# Patient Record
Sex: MALE | Race: BLACK OR AFRICAN AMERICAN | NOT HISPANIC OR LATINO | Employment: FULL TIME | URBAN - NONMETROPOLITAN AREA
[De-identification: names, ages, dates, MRNs, and addresses within clinical notes are randomized per-mention and may not be internally consistent; named-entity substitution may affect disease eponyms.]

---

## 2021-01-11 ENCOUNTER — HOSPITAL ENCOUNTER (EMERGENCY)
Facility: HOSPITAL | Age: 26
Discharge: HOME | End: 2021-01-11
Attending: EMERGENCY MEDICINE
Payer: MEDICAID

## 2021-01-11 VITALS
TEMPERATURE: 99 F | HEART RATE: 90 BPM | SYSTOLIC BLOOD PRESSURE: 110 MMHG | HEIGHT: 67 IN | BODY MASS INDEX: 29.66 KG/M2 | WEIGHT: 189 LBS | DIASTOLIC BLOOD PRESSURE: 64 MMHG | RESPIRATION RATE: 18 BRPM | OXYGEN SATURATION: 99 %

## 2021-01-11 DIAGNOSIS — J01.80 OTHER ACUTE SINUSITIS, RECURRENCE NOT SPECIFIED: Primary | ICD-10-CM

## 2021-01-11 PROCEDURE — 99284 EMERGENCY DEPT VISIT MOD MDM: CPT

## 2021-01-11 RX ORDER — AMOXICILLIN 875 MG/1
875 TABLET, FILM COATED ORAL 2 TIMES DAILY
Qty: 20 TABLET | Refills: 0 | Status: SHIPPED | OUTPATIENT
Start: 2021-01-11 | End: 2021-01-21

## 2021-01-11 RX ORDER — AMOXICILLIN 875 MG/1
875 TABLET, FILM COATED ORAL 2 TIMES DAILY
Qty: 20 TABLET | Refills: 0 | Status: SHIPPED | OUTPATIENT
Start: 2021-01-11 | End: 2021-01-11 | Stop reason: SDUPTHER

## 2021-01-11 RX ORDER — BROMPHENIRAMINE MALEATE, PSEUDOEPHEDRINE HYDROCHLORIDE, AND DEXTROMETHORPHAN HYDROBROMIDE 2; 30; 10 MG/5ML; MG/5ML; MG/5ML
10 SYRUP ORAL
Qty: 118 ML | Refills: 0 | Status: SHIPPED | OUTPATIENT
Start: 2021-01-11 | End: 2021-01-11 | Stop reason: SDUPTHER

## 2021-01-11 RX ORDER — BROMPHENIRAMINE MALEATE, PSEUDOEPHEDRINE HYDROCHLORIDE, AND DEXTROMETHORPHAN HYDROBROMIDE 2; 30; 10 MG/5ML; MG/5ML; MG/5ML
10 SYRUP ORAL
Qty: 118 ML | Refills: 0 | Status: SHIPPED | OUTPATIENT
Start: 2021-01-11 | End: 2021-01-21

## 2021-05-28 ENCOUNTER — HOSPITAL ENCOUNTER (EMERGENCY)
Facility: HOSPITAL | Age: 26
Discharge: HOME | End: 2021-05-28
Attending: EMERGENCY MEDICINE
Payer: MEDICAID

## 2021-05-28 VITALS
SYSTOLIC BLOOD PRESSURE: 109 MMHG | RESPIRATION RATE: 18 BRPM | OXYGEN SATURATION: 99 % | HEART RATE: 60 BPM | DIASTOLIC BLOOD PRESSURE: 67 MMHG | TEMPERATURE: 99 F

## 2021-05-28 DIAGNOSIS — S41.119A LACERATION OF ARM: ICD-10-CM

## 2021-05-28 DIAGNOSIS — S51.812A FOREARM LACERATION, LEFT, INITIAL ENCOUNTER: Primary | ICD-10-CM

## 2021-05-28 LAB
BASOPHILS # BLD AUTO: 0.03 K/UL (ref 0–0.2)
BASOPHILS NFR BLD: 0.2 % (ref 0–1.9)
DIFFERENTIAL METHOD: ABNORMAL
EOSINOPHIL # BLD AUTO: 0.2 K/UL (ref 0–0.5)
EOSINOPHIL NFR BLD: 1.3 % (ref 0–8)
ERYTHROCYTE [DISTWIDTH] IN BLOOD BY AUTOMATED COUNT: 14.4 % (ref 11.5–14.5)
HCT VFR BLD AUTO: 42.8 % (ref 40–54)
HGB BLD-MCNC: 14.1 G/DL (ref 14–18)
IMM GRANULOCYTES # BLD AUTO: 0.05 K/UL (ref 0–0.04)
IMM GRANULOCYTES NFR BLD AUTO: 0.4 % (ref 0–0.5)
LYMPHOCYTES # BLD AUTO: 5.1 K/UL (ref 1–4.8)
LYMPHOCYTES NFR BLD: 41.5 % (ref 18–48)
MCH RBC QN AUTO: 27.9 PG (ref 27–31)
MCHC RBC AUTO-ENTMCNC: 32.9 G/DL (ref 32–36)
MCV RBC AUTO: 85 FL (ref 82–98)
MONOCYTES # BLD AUTO: 0.8 K/UL (ref 0.3–1)
MONOCYTES NFR BLD: 6.5 % (ref 4–15)
NEUTROPHILS # BLD AUTO: 6.1 K/UL (ref 1.8–7.7)
NEUTROPHILS NFR BLD: 50.1 % (ref 38–73)
NRBC BLD-RTO: 0 /100 WBC
PLATELET # BLD AUTO: 263 K/UL (ref 150–450)
PMV BLD AUTO: 10.1 FL (ref 9.2–12.9)
RBC # BLD AUTO: 5.06 M/UL (ref 4.6–6.2)
WBC # BLD AUTO: 12.23 K/UL (ref 3.9–12.7)

## 2021-05-28 PROCEDURE — 85025 COMPLETE CBC W/AUTO DIFF WBC: CPT | Performed by: EMERGENCY MEDICINE

## 2021-05-28 PROCEDURE — 12002 RPR S/N/AX/GEN/TRNK2.6-7.5CM: CPT

## 2021-05-28 PROCEDURE — 96374 THER/PROPH/DIAG INJ IV PUSH: CPT

## 2021-05-28 PROCEDURE — 96361 HYDRATE IV INFUSION ADD-ON: CPT | Mod: 59

## 2021-05-28 PROCEDURE — 90715 TDAP VACCINE 7 YRS/> IM: CPT | Performed by: EMERGENCY MEDICINE

## 2021-05-28 PROCEDURE — 96375 TX/PRO/DX INJ NEW DRUG ADDON: CPT

## 2021-05-28 PROCEDURE — 63600175 PHARM REV CODE 636 W HCPCS: Performed by: EMERGENCY MEDICINE

## 2021-05-28 PROCEDURE — 99284 EMERGENCY DEPT VISIT MOD MDM: CPT | Mod: 25

## 2021-05-28 PROCEDURE — 25000003 PHARM REV CODE 250: Performed by: EMERGENCY MEDICINE

## 2021-05-28 PROCEDURE — 90471 IMMUNIZATION ADMIN: CPT | Performed by: EMERGENCY MEDICINE

## 2021-05-28 PROCEDURE — 36415 COLL VENOUS BLD VENIPUNCTURE: CPT | Performed by: EMERGENCY MEDICINE

## 2021-05-28 PROCEDURE — 96372 THER/PROPH/DIAG INJ SC/IM: CPT | Mod: 59

## 2021-05-28 RX ORDER — LIDOCAINE HYDROCHLORIDE AND EPINEPHRINE 10; 10 MG/ML; UG/ML
10 INJECTION, SOLUTION INFILTRATION; PERINEURAL ONCE
Status: COMPLETED | OUTPATIENT
Start: 2021-05-28 | End: 2021-05-28

## 2021-05-28 RX ORDER — FENTANYL CITRATE 50 UG/ML
50 INJECTION, SOLUTION INTRAMUSCULAR; INTRAVENOUS
Status: COMPLETED | OUTPATIENT
Start: 2021-05-28 | End: 2021-05-28

## 2021-05-28 RX ORDER — KETOROLAC TROMETHAMINE 10 MG/1
10 TABLET, FILM COATED ORAL EVERY 6 HOURS
Qty: 20 TABLET | Refills: 0 | Status: SHIPPED | OUTPATIENT
Start: 2021-05-28 | End: 2021-06-02

## 2021-05-28 RX ORDER — ONDANSETRON 2 MG/ML
4 INJECTION INTRAMUSCULAR; INTRAVENOUS
Status: COMPLETED | OUTPATIENT
Start: 2021-05-28 | End: 2021-05-28

## 2021-05-28 RX ADMIN — SODIUM CHLORIDE 1000 ML: 0.9 INJECTION, SOLUTION INTRAVENOUS at 03:05

## 2021-05-28 RX ADMIN — ONDANSETRON 4 MG: 2 INJECTION INTRAMUSCULAR; INTRAVENOUS at 03:05

## 2021-05-28 RX ADMIN — FENTANYL CITRATE 50 MCG: 50 INJECTION INTRAMUSCULAR; INTRAVENOUS at 03:05

## 2021-05-28 RX ADMIN — CLOSTRIDIUM TETANI TOXOID ANTIGEN (FORMALDEHYDE INACTIVATED), CORYNEBACTERIUM DIPHTHERIAE TOXOID ANTIGEN (FORMALDEHYDE INACTIVATED), BORDETELLA PERTUSSIS TOXOID ANTIGEN (GLUTARALDEHYDE INACTIVATED), BORDETELLA PERTUSSIS FILAMENTOUS HEMAGGLUTININ ANTIGEN (FORMALDEHYDE INACTIVATED), BORDETELLA PERTUSSIS PERTACTIN ANTIGEN, AND BORDETELLA PERTUSSIS FIMBRIAE 2/3 ANTIGEN 0.5 ML: 5; 2; 2.5; 5; 3; 5 INJECTION, SUSPENSION INTRAMUSCULAR at 03:05

## 2021-05-28 RX ADMIN — LIDOCAINE HYDROCHLORIDE,EPINEPHRINE BITARTRATE 10 ML: 10; .01 INJECTION, SOLUTION INFILTRATION; PERINEURAL at 04:05

## 2021-06-05 ENCOUNTER — HOSPITAL ENCOUNTER (EMERGENCY)
Facility: HOSPITAL | Age: 26
Discharge: HOME | End: 2021-06-05
Attending: EMERGENCY MEDICINE
Payer: MEDICAID

## 2021-06-05 VITALS
BODY MASS INDEX: 26.52 KG/M2 | OXYGEN SATURATION: 100 % | DIASTOLIC BLOOD PRESSURE: 86 MMHG | TEMPERATURE: 98 F | SYSTOLIC BLOOD PRESSURE: 115 MMHG | WEIGHT: 175 LBS | HEIGHT: 68 IN | RESPIRATION RATE: 16 BRPM | HEART RATE: 70 BPM

## 2021-06-05 DIAGNOSIS — Z20.2 STD EXPOSURE: Primary | ICD-10-CM

## 2021-06-05 PROCEDURE — 99281 EMR DPT VST MAYX REQ PHY/QHP: CPT

## 2021-06-05 RX ORDER — METRONIDAZOLE 500 MG/1
2000 TABLET ORAL
Status: DISCONTINUED | OUTPATIENT
Start: 2021-06-05 | End: 2021-06-05 | Stop reason: HOSPADM

## 2021-06-07 ENCOUNTER — HOSPITAL ENCOUNTER (EMERGENCY)
Facility: HOSPITAL | Age: 26
Discharge: HOME | End: 2021-06-07
Attending: EMERGENCY MEDICINE
Payer: MEDICAID

## 2021-06-07 VITALS
WEIGHT: 187 LBS | HEIGHT: 68 IN | DIASTOLIC BLOOD PRESSURE: 83 MMHG | RESPIRATION RATE: 18 BRPM | SYSTOLIC BLOOD PRESSURE: 130 MMHG | BODY MASS INDEX: 28.34 KG/M2 | HEART RATE: 106 BPM | OXYGEN SATURATION: 99 % | TEMPERATURE: 98 F

## 2021-06-07 DIAGNOSIS — Z48.02 VISIT FOR SUTURE REMOVAL: Primary | ICD-10-CM

## 2021-06-07 PROCEDURE — 99281 EMR DPT VST MAYX REQ PHY/QHP: CPT

## 2021-09-09 ENCOUNTER — HOSPITAL ENCOUNTER (EMERGENCY)
Facility: HOSPITAL | Age: 26
Discharge: HOME | End: 2021-09-09
Attending: EMERGENCY MEDICINE
Payer: MEDICAID

## 2021-09-09 VITALS
HEIGHT: 68 IN | HEART RATE: 75 BPM | RESPIRATION RATE: 18 BRPM | OXYGEN SATURATION: 98 % | SYSTOLIC BLOOD PRESSURE: 116 MMHG | TEMPERATURE: 98 F | BODY MASS INDEX: 27.89 KG/M2 | DIASTOLIC BLOOD PRESSURE: 81 MMHG | WEIGHT: 184 LBS

## 2021-09-09 DIAGNOSIS — R11.10 VOMITING AND DIARRHEA: Primary | ICD-10-CM

## 2021-09-09 DIAGNOSIS — R19.7 VOMITING AND DIARRHEA: Primary | ICD-10-CM

## 2021-09-09 PROCEDURE — 25000003 PHARM REV CODE 250: Performed by: EMERGENCY MEDICINE

## 2021-09-09 PROCEDURE — 99283 EMERGENCY DEPT VISIT LOW MDM: CPT

## 2021-09-09 RX ORDER — ONDANSETRON 4 MG/1
4 TABLET, FILM COATED ORAL EVERY 6 HOURS PRN
Qty: 12 TABLET | Refills: 0 | Status: SHIPPED | OUTPATIENT
Start: 2021-09-09 | End: 2022-03-10 | Stop reason: CLARIF

## 2021-09-09 RX ORDER — ONDANSETRON 4 MG/1
4 TABLET, ORALLY DISINTEGRATING ORAL
Status: COMPLETED | OUTPATIENT
Start: 2021-09-09 | End: 2021-09-09

## 2021-09-09 RX ADMIN — ONDANSETRON 4 MG: 4 TABLET, ORALLY DISINTEGRATING ORAL at 07:09

## 2021-09-23 ENCOUNTER — HOSPITAL ENCOUNTER (EMERGENCY)
Facility: HOSPITAL | Age: 26
Discharge: HOME | End: 2021-09-23
Attending: STUDENT IN AN ORGANIZED HEALTH CARE EDUCATION/TRAINING PROGRAM
Payer: MEDICAID

## 2021-09-23 VITALS
TEMPERATURE: 98 F | HEART RATE: 66 BPM | OXYGEN SATURATION: 99 % | DIASTOLIC BLOOD PRESSURE: 76 MMHG | WEIGHT: 184 LBS | RESPIRATION RATE: 18 BRPM | HEIGHT: 68 IN | BODY MASS INDEX: 27.89 KG/M2 | SYSTOLIC BLOOD PRESSURE: 120 MMHG

## 2021-09-23 DIAGNOSIS — R21 RASH AND NONSPECIFIC SKIN ERUPTION: Primary | ICD-10-CM

## 2021-09-23 PROCEDURE — 99283 EMERGENCY DEPT VISIT LOW MDM: CPT

## 2021-09-23 RX ORDER — PREDNISONE 20 MG/1
20 TABLET ORAL DAILY
Qty: 5 TABLET | Refills: 0 | Status: SHIPPED | OUTPATIENT
Start: 2021-09-23 | End: 2021-09-28

## 2021-12-27 ENCOUNTER — HOSPITAL ENCOUNTER (EMERGENCY)
Facility: HOSPITAL | Age: 26
Discharge: HOME | End: 2021-12-27
Attending: STUDENT IN AN ORGANIZED HEALTH CARE EDUCATION/TRAINING PROGRAM
Payer: MEDICAID

## 2021-12-27 VITALS
HEIGHT: 68 IN | BODY MASS INDEX: 28.64 KG/M2 | WEIGHT: 189 LBS | TEMPERATURE: 99 F | RESPIRATION RATE: 18 BRPM | SYSTOLIC BLOOD PRESSURE: 111 MMHG | HEART RATE: 104 BPM | DIASTOLIC BLOOD PRESSURE: 72 MMHG | OXYGEN SATURATION: 98 %

## 2021-12-27 DIAGNOSIS — U07.1 COVID-19: Primary | ICD-10-CM

## 2021-12-27 LAB
CTP QC/QA: YES
SARS-COV-2 RDRP RESP QL NAA+PROBE: POSITIVE

## 2021-12-27 PROCEDURE — U0002 COVID-19 LAB TEST NON-CDC: HCPCS | Performed by: NURSE PRACTITIONER

## 2021-12-27 PROCEDURE — 99283 EMERGENCY DEPT VISIT LOW MDM: CPT | Mod: 25

## 2021-12-27 RX ORDER — DICYCLOMINE HYDROCHLORIDE 20 MG/1
20 TABLET ORAL 2 TIMES DAILY
Qty: 6 TABLET | Refills: 0 | Status: SHIPPED | OUTPATIENT
Start: 2021-12-27 | End: 2021-12-30

## 2021-12-27 NOTE — Clinical Note
"Nicholas"Aidee Mckeon was seen and treated in our emergency department on 12/27/2021.     COVID-19 is present in our communities across the state. There is limited testing for COVID at this time, so not all patients can be tested. In this situation, your employee meets the following criteria:    Nicholas Mckeon has met the criteria for COVID-19 testing and has a POSITIVE result. He can return to work once they are asymptomatic for 72 hours without the use of fever reducing medications AND at least ten days from the first positive result.     If you have any questions or concerns, or if I can be of further assistance, please do not hesitate to contact me.    Sincerely,             Fannie Rouse NP"

## 2021-12-28 NOTE — ED PROVIDER NOTES
Encounter Date: 12/27/2021       History     Chief Complaint   Patient presents with    COVID-19 Concerns     Cough, diarrhea, body aches. Pt reports covid exposure.      This is a 25 y/o bm with noncontributory pmhx who presents to the ED with concerns regarding Covid-19 after known exposure. Pt reports waking up today with body aches, nonproductive cough and intermittent diarrhea. Pt denies known fever, chills, chest pain, sob, vomiting, or abdominal pain.         Review of patient's allergies indicates:  No Known Allergies  No past medical history on file.  No past surgical history on file.  No family history on file.  Social History     Tobacco Use    Smoking status: Current Every Day Smoker     Packs/day: 0.50    Smokeless tobacco: Never Used   Substance Use Topics    Alcohol use: Yes     Alcohol/week: 0.0 standard drinks     Comment: very rarely, 1 drink    Drug use: Yes     Types: Marijuana     Review of Systems   Constitutional: Negative.    HENT: Negative.    Respiratory: Positive for cough. Negative for chest tightness and shortness of breath.    Cardiovascular: Negative.    Gastrointestinal: Positive for diarrhea. Negative for abdominal pain, nausea and vomiting.   Musculoskeletal: Positive for myalgias.   Neurological: Negative.        Physical Exam     Initial Vitals [12/27/21 1824]   BP Pulse Resp Temp SpO2   111/72 104 18 98.8 °F (37.1 °C) 98 %      MAP       --         Physical Exam    Nursing note and vitals reviewed.  Constitutional: He appears well-developed and well-nourished. He is active. No distress.   HENT:   Head: Normocephalic and atraumatic.   Mouth/Throat: Oropharynx is clear and moist. No oropharyngeal exudate.   Eyes: EOM are normal. Pupils are equal, round, and reactive to light.   Neck: Neck supple.   Normal range of motion.  Cardiovascular: Normal rate, regular rhythm and normal heart sounds.   Pulmonary/Chest: Breath sounds normal. No respiratory distress.   Abdominal: Abdomen  is soft. Bowel sounds are normal. He exhibits no distension. There is no abdominal tenderness.   Musculoskeletal:         General: Normal range of motion.      Cervical back: Normal range of motion and neck supple.     Neurological: He is alert and oriented to person, place, and time. GCS score is 15. GCS eye subscore is 4. GCS verbal subscore is 5. GCS motor subscore is 6.   Skin: Skin is warm and dry. Capillary refill takes less than 2 seconds.   Psychiatric: He has a normal mood and affect. His behavior is normal. Thought content normal.         ED Course   Procedures  Labs Reviewed   SARS-COV-2 RDRP GENE - Abnormal; Notable for the following components:       Result Value    POC Rapid COVID Positive (*)     All other components within normal limits    Narrative:     This test utilizes isothermal nucleic acid amplification   technology to detect the SARS-CoV-2 RdRp nucleic acid segment.   The analytical sensitivity (limit of detection) is 125 genome   equivalents/mL.   A POSITIVE result implies infection with the SARS-CoV-2 virus;   the patient is presumed to be contagious.     A NEGATIVE result means that SARS-CoV-2 nucleic acids are not   present above the limit of detection. A NEGATIVE result should be   treated as presumptive. It does not rule out the possibility of   COVID-19 and should not be the sole basis for treatment decisions.   If COVID-19 is strongly suspected based on clinical and exposure   history, re-testing using an alternate molecular assay should be   considered.   This test is only for use under the Food and Drug   Administration s Emergency Use Authorization (EUA).   Commercial kits are provided by [employer].   Performance characteristics of the EUA have been independently   verified by [facility] Department of   Pathology and Laboratory Medicine.   _________________________________________________________________   The authorized Fact Sheet for Healthcare Providers and  the authorized Fact   Sheet for Patients of the ID NOW COVID-19 are available on the FDA   website:     https://www.fda.gov/media/051110/download  https://www.fda.gov/media/626144/download                Imaging Results    None          Medications - No data to display              ED Course as of 12/27/21 1837   Mon Dec 27, 2021   1836 Rapid covid-19 test pos [CB]      ED Course User Index  [CB] Fannie Rouse NP             Clinical Impression:   Final diagnoses:  [U07.1] COVID-19 (Primary)          ED Disposition Condition    Discharge Stable        ED Prescriptions     Medication Sig Dispense Start Date End Date Auth. Provider    dicyclomine (BENTYL) 20 mg tablet Take 1 tablet (20 mg total) by mouth 2 (two) times daily. for 3 days 6 tablet 12/27/2021 12/30/2021 Fannie Rouse NP        Follow-up Information     Follow up With Specialties Details Why Contact Info    PCP Follow UP  Call in 2 days for follow-up, for re-evaluation of today's complaint            Fannie Rouse NP  12/27/21 1837

## 2021-12-28 NOTE — DISCHARGE INSTRUCTIONS
Take medications as directed, and you may take over-the-counter medications as directed for your symptoms.  Alternate Tylenol and Motrin every 3 hours as directed for pain/fever.  Return to the emergency room for worsening condition.

## 2022-01-08 NOTE — ED PROVIDER NOTES
Encounter Date: 12/27/2021       History     Chief Complaint   Patient presents with    COVID-19 Concerns     Cough, diarrhea, body aches. Pt reports covid exposure.      27 y/o female covid exposure presents with flu like symptom        Review of patient's allergies indicates:  No Known Allergies  No past medical history on file.  No past surgical history on file.  No family history on file.  Social History     Tobacco Use    Smoking status: Current Every Day Smoker     Packs/day: 0.50    Smokeless tobacco: Never Used   Substance Use Topics    Alcohol use: Yes     Alcohol/week: 0.0 standard drinks     Comment: very rarely, 1 drink    Drug use: Yes     Types: Marijuana     Review of Systems   Constitutional: Negative.    HENT: Negative.    Respiratory: Positive for cough.    Cardiovascular: Negative.    Gastrointestinal: Positive for diarrhea.   Genitourinary: Negative.    Musculoskeletal: Positive for myalgias.   Skin: Negative.    Neurological: Negative.    Psychiatric/Behavioral: Negative.    All other systems reviewed and are negative.      Physical Exam     Initial Vitals [12/27/21 1824]   BP Pulse Resp Temp SpO2   111/72 104 18 98.8 °F (37.1 °C) 98 %      MAP       --         Physical Exam    Constitutional: Vital signs are normal. He appears well-developed and well-nourished.   HENT:   Head: Normocephalic and atraumatic.   Eyes: Conjunctivae and lids are normal.   Neck: Trachea normal. Neck supple.   Cardiovascular: Normal rate, regular rhythm and normal pulses.   Pulmonary/Chest: Breath sounds normal. He has no wheezes. He has no rhonchi.   Abdominal: Abdomen is soft. Bowel sounds are normal.   Musculoskeletal:         General: Normal range of motion.      Cervical back: Neck supple.     Neurological: He is alert and oriented to person, place, and time.   Skin: Skin is warm. Capillary refill takes less than 2 seconds.   Psychiatric: He has a normal mood and affect. His speech is normal.         ED  Course   Procedures  Labs Reviewed   SARS-COV-2 RDRP GENE - Abnormal; Notable for the following components:       Result Value    POC Rapid COVID Positive (*)     All other components within normal limits    Narrative:     This test utilizes isothermal nucleic acid amplification   technology to detect the SARS-CoV-2 RdRp nucleic acid segment.   The analytical sensitivity (limit of detection) is 125 genome   equivalents/mL.   A POSITIVE result implies infection with the SARS-CoV-2 virus;   the patient is presumed to be contagious.     A NEGATIVE result means that SARS-CoV-2 nucleic acids are not   present above the limit of detection. A NEGATIVE result should be   treated as presumptive. It does not rule out the possibility of   COVID-19 and should not be the sole basis for treatment decisions.   If COVID-19 is strongly suspected based on clinical and exposure   history, re-testing using an alternate molecular assay should be   considered.   This test is only for use under the Food and Drug   Administration s Emergency Use Authorization (EUA).   Commercial kits are provided by [employer].   Performance characteristics of the EUA have been independently   verified by [facility] Department of   Pathology and Laboratory Medicine.   _________________________________________________________________   The authorized Fact Sheet for Healthcare Providers and the authorized Fact   Sheet for Patients of the ID NOW COVID-19 are available on the FDA   website:     https://www.fda.gov/media/930768/download  https://www.fda.gov/media/297890/download                Imaging Results    None          Medications - No data to display  Medical Decision Making:   Initial Assessment:   27 y/o male covid positive. Afebrile vitals stable. Recommend symptomatic relief. Return precaution. covid [facility]  Clinical Tests:   Lab Tests: Ordered and Reviewed             ED Course as of 01/08/22 0348   Mon Dec 27, 2021   0942 Rapid  covid-19 test pos [CB]      ED Course User Index  [CB] Fannie Rouse NP             Clinical Impression:   Final diagnoses:  [U07.1] COVID-19 (Primary)          ED Disposition Condition    Discharge Stable        ED Prescriptions     Medication Sig Dispense Start Date End Date Auth. Provider    dicyclomine (BENTYL) 20 mg tablet () Take 1 tablet (20 mg total) by mouth 2 (two) times daily. for 3 days 6 tablet 2021 Fannie Rouse NP        Follow-up Information     Follow up With Specialties Details Why Contact Info    PCP Follow UP  Call in 2 days for follow-up, for re-evaluation of today's complaint            Jose De Jesus Peralta MD  [phone]

## 2022-03-10 ENCOUNTER — HOSPITAL ENCOUNTER (EMERGENCY)
Facility: HOSPITAL | Age: 27
Discharge: HOME | End: 2022-03-10
Attending: FAMILY MEDICINE
Payer: MEDICAID

## 2022-03-10 VITALS
HEART RATE: 81 BPM | WEIGHT: 186 LBS | TEMPERATURE: 98 F | BODY MASS INDEX: 28.19 KG/M2 | RESPIRATION RATE: 18 BRPM | OXYGEN SATURATION: 100 % | HEIGHT: 68 IN | DIASTOLIC BLOOD PRESSURE: 96 MMHG | SYSTOLIC BLOOD PRESSURE: 139 MMHG

## 2022-03-10 DIAGNOSIS — K52.9 GASTROENTERITIS: Primary | ICD-10-CM

## 2022-03-10 PROCEDURE — 99283 EMERGENCY DEPT VISIT LOW MDM: CPT

## 2022-03-10 PROCEDURE — 25000003 PHARM REV CODE 250: Performed by: FAMILY MEDICINE

## 2022-03-10 RX ORDER — ONDANSETRON 4 MG/1
4 TABLET, ORALLY DISINTEGRATING ORAL
Status: COMPLETED | OUTPATIENT
Start: 2022-03-10 | End: 2022-03-10

## 2022-03-10 RX ORDER — ONDANSETRON 4 MG/1
4 TABLET, FILM COATED ORAL EVERY 6 HOURS
Qty: 12 TABLET | Refills: 0 | Status: SHIPPED | OUTPATIENT
Start: 2022-03-10

## 2022-03-10 RX ADMIN — ONDANSETRON 4 MG: 4 TABLET, ORALLY DISINTEGRATING ORAL at 07:03

## 2022-03-10 NOTE — ED PROVIDER NOTES
Encounter Date: 3/10/2022       History     Chief Complaint   Patient presents with    Vomiting     N/V SINCE 6:30 THIS AM       Vomiting   This is a new problem. The current episode started today. Episode frequency: One time. The problem has been unchanged. The emesis has an appearance of stomach contents. Associated symptoms include diarrhea. Pertinent negatives include no abdominal pain, no arthralgias, no chills, no cough, no fever, no headaches, no myalgias, no sweats and no URI.     Review of patient's allergies indicates:  No Known Allergies  History reviewed. No pertinent past medical history.  History reviewed. No pertinent surgical history.  History reviewed. No pertinent family history.  Social History     Tobacco Use    Smoking status: Current Every Day Smoker     Packs/day: 0.50    Smokeless tobacco: Never Used   Substance Use Topics    Alcohol use: Yes     Alcohol/week: 0.0 standard drinks     Comment: very rarely, 1 drink    Drug use: Yes     Types: Marijuana     Review of Systems   Constitutional: Negative for chills and fever.   Respiratory: Negative for cough.    Gastrointestinal: Positive for diarrhea and vomiting. Negative for abdominal pain.   Musculoskeletal: Negative for arthralgias and myalgias.   Neurological: Negative for headaches.   All other systems reviewed and are negative.      Physical Exam     Initial Vitals [03/10/22 0716]   BP Pulse Resp Temp SpO2   (!) 139/96 81 18 97.7 °F (36.5 °C) 100 %      MAP       --         Physical Exam    Nursing note and vitals reviewed.  Constitutional: Vital signs are normal. He appears well-developed and well-nourished.   HENT:   Head: Normocephalic and atraumatic.   Eyes: Conjunctivae, EOM and lids are normal. Pupils are equal, round, and reactive to light. Lids are everted and swept, no foreign bodies found.   Neck: Trachea normal and phonation normal. Neck supple.   Normal range of motion.   Full passive range of motion without pain.      Cardiovascular: Normal rate, regular rhythm, normal heart sounds, intact distal pulses and normal pulses.   Pulmonary/Chest: Breath sounds normal. No respiratory distress. He has no wheezes. He has no rhonchi. He has no rales. He exhibits no tenderness.   Abdominal: Abdomen is soft. Bowel sounds are normal. He exhibits no distension and no mass. There is no abdominal tenderness. There is no rebound and no guarding.   Musculoskeletal:         General: Normal range of motion.      Cervical back: Full passive range of motion without pain, normal range of motion and neck supple.     Neurological: He is alert and oriented to person, place, and time. He has normal strength and normal reflexes.   Skin: Capillary refill takes less than 2 seconds.         ED Course   Procedures  Labs Reviewed - No data to display       Imaging Results    None          Medications   ondansetron disintegrating tablet 4 mg (4 mg Oral Given 3/10/22 0728)     Medical Decision Making:   ED Management:  Patient afebrile, tolerate Sprite in ED.  patient's presentation and symptoms are consistent with viral gastroenteritis.  Discussed with patient risks and benefits medication fluid side effects.  Patient will be started on bland diet and Zofran p.r.n. nausea.  Discussed with patient that if symptoms increase or worsen to return to ED or see PCP.  Patient reports he understands plan of care                      Clinical Impression:   Final diagnoses:  [K52.9] Gastroenteritis (Primary)          ED Disposition Condition    Discharge Stable        ED Prescriptions     Medication Sig Dispense Start Date End Date Auth. Provider    ondansetron (ZOFRAN) 4 MG tablet Take 1 tablet (4 mg total) by mouth every 6 (six) hours. 12 tablet 3/10/2022  Wing Woodward Jr., MD        Follow-up Information     Follow up With Specialties Details Why Contact Info    PCP  In 2 days As needed, If symptoms worsen            Wing Woodward Jr., MD  03/10/22 [id number]

## 2022-03-10 NOTE — Clinical Note
"Nicholas"Aidee Mckeon was seen and treated in our emergency department on 3/10/2022.  He may return to work on 03/11/2022.       If you have any questions or concerns, please don't hesitate to call.      Wing Woodward Jr., MD"

## 2022-04-14 ENCOUNTER — HOSPITAL ENCOUNTER (EMERGENCY)
Facility: HOSPITAL | Age: 27
Discharge: HOME | End: 2022-04-14
Attending: EMERGENCY MEDICINE
Payer: MEDICAID

## 2022-04-14 VITALS
HEIGHT: 67 IN | RESPIRATION RATE: 18 BRPM | HEART RATE: 61 BPM | OXYGEN SATURATION: 98 % | TEMPERATURE: 98 F | DIASTOLIC BLOOD PRESSURE: 80 MMHG | SYSTOLIC BLOOD PRESSURE: 131 MMHG | WEIGHT: 181 LBS | BODY MASS INDEX: 28.41 KG/M2

## 2022-04-14 DIAGNOSIS — S39.012A LUMBAR STRAIN, INITIAL ENCOUNTER: Primary | ICD-10-CM

## 2022-04-14 PROCEDURE — 63600175 PHARM REV CODE 636 W HCPCS: Performed by: EMERGENCY MEDICINE

## 2022-04-14 PROCEDURE — 96372 THER/PROPH/DIAG INJ SC/IM: CPT | Performed by: EMERGENCY MEDICINE

## 2022-04-14 PROCEDURE — 25000003 PHARM REV CODE 250: Performed by: EMERGENCY MEDICINE

## 2022-04-14 PROCEDURE — 99284 EMERGENCY DEPT VISIT MOD MDM: CPT | Mod: 25

## 2022-04-14 RX ORDER — KETOROLAC TROMETHAMINE 30 MG/ML
15 INJECTION, SOLUTION INTRAMUSCULAR; INTRAVENOUS
Status: COMPLETED | OUTPATIENT
Start: 2022-04-14 | End: 2022-04-14

## 2022-04-14 RX ORDER — LIDOCAINE 50 MG/G
1 PATCH TOPICAL DAILY PRN
Qty: 10 PATCH | Refills: 0 | Status: SHIPPED | OUTPATIENT
Start: 2022-04-14 | End: 2022-04-24

## 2022-04-14 RX ORDER — NAPROXEN 500 MG/1
500 TABLET ORAL EVERY 12 HOURS PRN
Qty: 20 TABLET | Refills: 0 | Status: SHIPPED | OUTPATIENT
Start: 2022-04-14

## 2022-04-14 RX ORDER — LIDOCAINE 50 MG/G
1 PATCH TOPICAL
Status: DISCONTINUED | OUTPATIENT
Start: 2022-04-14 | End: 2022-04-14 | Stop reason: HOSPADM

## 2022-04-14 RX ADMIN — KETOROLAC TROMETHAMINE 15 MG: 30 INJECTION, SOLUTION INTRAMUSCULAR at 08:04

## 2022-04-14 RX ADMIN — LIDOCAINE 1 PATCH: 50 PATCH TOPICAL at 08:04

## 2022-04-14 NOTE — ED NOTES
NEUROLOGICAL:   Patient is awake , alert  and oriented x 4 . Pupils are PERRL. Gait is steady.   Moves all extremities without difficulty.   Patient reports no neuro complaints..  GCS 15    HEENT:   Head appears normocephalic  and symmetric .   Eyes appear WNL to both eyes. Patient reports no complaints  to both eyes .   Ears appear WNL. Patient reports no complaints  to both ears.   Nares appear patent . Patient reports no nose complaints .  Mouth appears moist, pink and teeth intact. Patient reports no mouth complaints.   Throat appears pink and moist . Patient reports no throat complaints.    CARDIOVASCULAR:   S1 and S2 present, no murmurs, gallops, or rubs, rate regular  and pulses palpable (2+)    On palpation no edema noted , noted to none.   Patient reports no CV complaints.  .   Patient vitals are WNL.    RESPIRATORY:   Airway Clear, Open, and Patent.  Respirations are even and unlabored.   Breath sounds clear  to all lung fields.   Patient reports no respiratory complaints.     GASTROINTESTINAL:   Abdomen is soft  and non-tender x 4 quadrants. Bowel sounds are normoactive to all quadrants .   Patient reports no GI complaints .     GENITOURINARY:   Patient reports no  complaints.     MUSCULOSKELETAL:   full range of motion to all extremities, no swelling noted , no tenderness noted and no weakness noted.   Patient reports pain in right mid back upon movement.    SKIN:   Skin appears warm , dry , good turgor, color normal for race and intact. Patient reports no skin complaints.

## 2022-04-14 NOTE — ED PROVIDER NOTES
EMERGENCY DEPARTMENT HISTORY AND PHYSICAL EXAM          Date: 4/14/2022   Patient Name: Nicholas Mckeon       History of Presenting Illness           Chief Complaint   Patient presents with    Back Pain     [place] weed eater in just his right hand and he now has pain in his mid right back area.         History Provided By: Patient    0817   Nicholas Mckeon is a 27 y.o. male with PMHX of no significant history who presents to the emergency department C/O back pain.    Patient reports that he was working yesterday using a weed whacker.  Says at 1 point he was holding the weed whacker in his right hand only and doing sweeping like motions.  He thinks he might have pulled a muscle in his back.  He reports right side of the low back pain worse with certain body motions.      PCP: Primary Doctor No        Current Facility-Administered Medications   Medication Dose Route Frequency Provider Last Rate Last Admin    ketorolac injection 15 mg  15 mg Intramuscular ED 1 Time Pedro Martinez MD        LIDOcaine 5 % patch 1 patch  1 patch Transdermal ED 1 Time Pedro Martinez MD         Current Outpatient Medications   Medication Sig Dispense Refill    LIDOcaine (LIDODERM) 5 % Place 1 patch onto the skin daily as needed (Pain). Remove & Discard patch within 12 hours or as directed by MD 10 patch 0    naproxen (NAPROSYN) 500 MG tablet Take 1 tablet (500 mg total) by mouth every 12 (twelve) hours as needed (Pain). 20 tablet 0    ondansetron (ZOFRAN) 4 MG tablet Take 1 tablet (4 mg total) by mouth every 6 (six) hours. 12 tablet 0           Past History     Past Medical History:   No past medical history on file.     Past Surgical History:   No past surgical history on file.     Family History:   No family history on file.     Social History:   Social History     Tobacco Use    Smoking status: Current Every Day Smoker     Packs/day: 0.50    Smokeless  "tobacco: Never Used   Substance Use Topics    Alcohol use: Yes     Alcohol/week: 0.0 standard drinks     Comment: very rarely, 1 drink    Drug use: Yes     Types: Marijuana        Allergies:   Review of patient's allergies indicates:  No Known Allergies       Review of Systems   Review of Systems   Constitutional: Negative for appetite change, chills and fever.   HENT: Negative for congestion, rhinorrhea and sore throat.    Eyes: Negative for pain and discharge.   Respiratory: Negative for cough, chest tightness, shortness of breath and wheezing.    Cardiovascular: Negative for chest pain and palpitations.   Gastrointestinal: Negative for abdominal pain, nausea and vomiting.   Genitourinary: Negative for difficulty urinating and dysuria.   Musculoskeletal: Positive for back pain. Negative for myalgias and neck pain.   Skin: Negative for rash and wound.   Neurological: Negative for dizziness, weakness, numbness and headaches.   Psychiatric/Behavioral: Negative for confusion and dysphoric mood.   All other systems reviewed and are negative.               Physical Exam     Vitals:    04/14/22 0811   BP: 131/80   BP Location: Right arm   Patient Position: Sitting   Pulse: 61   Resp: 18   Temp: 97.8 °F (36.6 °C)   TempSrc: Oral   SpO2: 98%   Weight: 82.1 kg (181 lb)   Height: 5' 7" (1.702 m)      Physical Exam  Vitals and nursing note reviewed.   Constitutional:       General: He is not in acute distress.     Appearance: Normal appearance. He is well-developed. He is not ill-appearing.   HENT:      Head: Normocephalic and atraumatic.      Nose: No congestion or rhinorrhea.   Eyes:      Conjunctiva/sclera: Conjunctivae normal.      Pupils: Pupils are equal, round, and reactive to light.   Cardiovascular:      Rate and Rhythm: Regular rhythm.   Pulmonary:      Effort: Pulmonary effort is normal. No respiratory distress.   Musculoskeletal:         General: No deformity or signs of injury. Normal range of motion.      " Cervical back: Normal range of motion and neck supple.      Lumbar back: Tenderness present. No bony tenderness. Normal range of motion. Negative right straight leg raise test and negative left straight leg raise test.        Back:    Skin:     General: Skin is warm and dry.      Coloration: Skin is not pale.      Findings: No lesion.   Neurological:      General: No focal deficit present.      Mental Status: He is alert and oriented to person, place, and time.      Cranial Nerves: No cranial nerve deficit.      Sensory: No sensory deficit.      Motor: No weakness.   Psychiatric:         Mood and Affect: Mood normal.         Behavior: Behavior normal.              Diagnostic Study Results      Labs -   No results found for this or any previous visit (from the past 12 hour(s)).     Radiologic Studies -    No orders to display        Medications given in the ED-   Medications   ketorolac injection 15 mg (has no administration in time range)   LIDOcaine 5 % patch 1 patch (has no administration in time range)           Medical Decision Making    I am the first provider for this patient.     I reviewed the vital signs, available nursing notes, past medical history, past surgical history, family history and social history.     Vital Signs:  Reviewed the patient's vital signs.     Pulse Oximetry Analysis and Interpretation:    98% on Room Air, normal      Records Reviewed: Nursing notes.        Provider Notes (Medical Decision Making): Nicholas Mckeon is a 27 y.o. male here with low back pain.  Benign exam.  No red flag symptoms.  Patient requesting work note which was provided.      Procedures:   Procedures      ED Course:               Diagnosis and Disposition     Critical Care:      DISCHARGE NOTE:       Nicholas Mckeon's  results have been reviewed with him.  He has been counseled regarding his diagnosis, treatment, and plan.  He verbally conveys understanding and agreement of the signs, symptoms, diagnosis,  treatment and prognosis and additionally agrees to follow up as discussed.  He also agrees with the care-plan and conveys that all of his questions have been answered.  I have also provided discharge instructions for him that include: educational information regarding their diagnosis and treatment, and list of reasons why they would want to return to the ED prior to their follow-up appointment, should his condition change. He has been provided with education for proper emergency department utilization.         CLINICAL IMPRESSION:         1. Lumbar strain, initial encounter              PLAN:   1. Discharge Home  2.      Medication List      START taking these medications    LIDOcaine 5 %  Commonly known as: LIDODERM  Place 1 patch onto the skin daily as needed (Pain). Remove & Discard patch within 12 hours or as directed by MD     naproxen 500 MG tablet  Commonly known as: NAPROSYN  Take 1 tablet (500 mg total) by mouth every 12 (twelve) hours as needed (Pain).        ASK your doctor about these medications    ondansetron 4 MG tablet  Commonly known as: ZOFRAN  Take 1 tablet (4 mg total) by mouth every 6 (six) hours.           Where to Get Your Medications      These medications were sent to [id number] - [address] AT [facility] & [address] [address]    Phone: [phone]   · LIDOcaine 5 %  · naproxen 500 MG tablet        [facility]. [place] - Emergency Department  [address] [facility] [address] [phone]  Go to   If symptoms worsen       _______________________________     Please note that this dictation was completed with [employer], the computer voice recognition software.  Quite often unanticipated grammatical, syntax, homophones, and other interpretive errors are inadvertently transcribed by the computer software.  Please disregard these errors.  Please excuse any errors that have escaped final proofreading.              Pedro Martinez MD  04/14/22 0819

## 2022-04-14 NOTE — Clinical Note
"Nicholas Menjivar" Zoeyyamilemargarita was seen and treated in our emergency department on 4/14/2022.  He may return to work on 04/18/2022.       If you have any questions or concerns, please don't hesitate to call.      Nguyen Pack RN    "

## 2022-08-17 ENCOUNTER — HOSPITAL ENCOUNTER (EMERGENCY)
Facility: HOSPITAL | Age: 27
Discharge: HOME | End: 2022-08-17
Attending: EMERGENCY MEDICINE
Payer: MEDICAID

## 2022-08-17 VITALS
BODY MASS INDEX: 28.66 KG/M2 | SYSTOLIC BLOOD PRESSURE: 121 MMHG | WEIGHT: 182.63 LBS | HEART RATE: 67 BPM | HEIGHT: 67 IN | RESPIRATION RATE: 18 BRPM | OXYGEN SATURATION: 100 % | DIASTOLIC BLOOD PRESSURE: 84 MMHG | TEMPERATURE: 98 F

## 2022-08-17 DIAGNOSIS — J02.9 PHARYNGITIS, UNSPECIFIED ETIOLOGY: Primary | ICD-10-CM

## 2022-08-17 PROCEDURE — 63600175 PHARM REV CODE 636 W HCPCS: Performed by: EMERGENCY MEDICINE

## 2022-08-17 PROCEDURE — 96372 THER/PROPH/DIAG INJ SC/IM: CPT | Performed by: EMERGENCY MEDICINE

## 2022-08-17 PROCEDURE — 99284 EMERGENCY DEPT VISIT MOD MDM: CPT

## 2022-08-17 RX ORDER — DEXAMETHASONE SODIUM PHOSPHATE 4 MG/ML
8 INJECTION, SOLUTION INTRA-ARTICULAR; INTRALESIONAL; INTRAMUSCULAR; INTRAVENOUS; SOFT TISSUE
Status: COMPLETED | OUTPATIENT
Start: 2022-08-17 | End: 2022-08-17

## 2022-08-17 RX ADMIN — DEXAMETHASONE SODIUM PHOSPHATE 8 MG: 4 INJECTION, SOLUTION INTRA-ARTICULAR; INTRALESIONAL; INTRAMUSCULAR; INTRAVENOUS; SOFT TISSUE at 08:08

## 2022-08-17 NOTE — ED PROVIDER NOTES
Encounter Date: 8/17/2022       History     Chief Complaint   Patient presents with    Sore Throat     Pt presents to the ER due to sore throat and congestion. Pt states symptoms started yesterday, complains of difficulty swallowing, runny nose/congestion, and a sore throat. Pt denies any other complaints, states no known exposure to sick persons.     27-year-old male complaining of sore throat since yesterday.  No fever.  No trouble swallowing.  No hot potato voice.  Complaining of sinus congestion as well.  No nausea vomiting diarrhea no shortness of breath.  No known exposure to COVID-19        Review of patient's allergies indicates:  No Known Allergies  History reviewed. No pertinent past medical history.  No past surgical history on file.  No family history on file.  Social History     Tobacco Use    Smoking status: Current Every Day Smoker     Packs/day: 0.50     Years: 12.00     Pack years: 6.00     Types: Vaping with nicotine    Smokeless tobacco: Never Used   Substance Use Topics    Alcohol use: Yes     Alcohol/week: 0.0 standard drinks     Comment: very rarely, 1 drink    Drug use: Yes     Frequency: 7.0 times per week     Types: Marijuana     Comment: pt states he smokes marijuana daily     Review of Systems   Constitutional: Negative for fever.   HENT: Positive for congestion and sore throat.    Respiratory: Negative for shortness of breath.    Cardiovascular: Negative for chest pain.   Gastrointestinal: Negative for nausea.   Genitourinary: Negative for dysuria.   Musculoskeletal: Negative for back pain.   Skin: Negative for rash.   Neurological: Negative for weakness.   Hematological: Does not bruise/bleed easily.   All other systems reviewed and are negative.      Physical Exam     Initial Vitals [08/17/22 0810]   BP Pulse Resp Temp SpO2   121/84 67 18 97.6 °F (36.4 °C) 100 %      MAP       --         Physical Exam    Nursing note and vitals reviewed.  Constitutional: He appears well-developed  and well-nourished. He is not diaphoretic. No distress.   HENT:   Head: Normocephalic and atraumatic.   Minimal erythema posterior pharynx, no exudate, airway patent   Eyes: Conjunctivae and EOM are normal. Pupils are equal, round, and reactive to light. Right eye exhibits no discharge. Left eye exhibits no discharge. No scleral icterus.   Neck: Neck supple. No JVD present.   Normal range of motion.  Cardiovascular: Normal rate, regular rhythm, normal heart sounds and intact distal pulses.   No murmur heard.  Pulmonary/Chest: Breath sounds normal. No stridor. No respiratory distress. He has no wheezes. He has no rhonchi. He has no rales. He exhibits no tenderness.   Abdominal: Abdomen is soft. Bowel sounds are normal. He exhibits no distension and no mass. There is no abdominal tenderness. There is no rebound and no guarding.   Musculoskeletal:         General: No tenderness or edema. Normal range of motion.      Cervical back: Normal range of motion and neck supple.     Neurological: He is alert and oriented to person, place, and time. He has normal strength. GCS score is 15. GCS eye subscore is 4. GCS verbal subscore is 5. GCS motor subscore is 6.   Skin: Skin is warm and dry. Capillary refill takes less than 2 seconds.         ED Course   Procedures  Labs Reviewed - No data to display       Imaging Results    None          Medications   dexamethasone injection 8 mg (has no administration in time range)     Medical Decision Making:   Differential Diagnosis:   Pharyngitis                      Clinical Impression:   Final diagnoses:  [J02.9] Pharyngitis, unspecified etiology (Primary)          ED Disposition Condition    Discharge Stable        ED Prescriptions     None        Follow-up Information     Follow up With Specialties Details Why Contact Info    Primary care physician  In 2 days             Iron Velazquez MD  08/17/22 5089

## 2022-12-27 ENCOUNTER — HOSPITAL ENCOUNTER (EMERGENCY)
Facility: HOSPITAL | Age: 27
Discharge: HOME | End: 2022-12-27
Attending: EMERGENCY MEDICINE
Payer: MEDICAID

## 2022-12-27 VITALS
DIASTOLIC BLOOD PRESSURE: 77 MMHG | OXYGEN SATURATION: 97 % | HEIGHT: 67 IN | SYSTOLIC BLOOD PRESSURE: 132 MMHG | TEMPERATURE: 98 F | WEIGHT: 214 LBS | HEART RATE: 64 BPM | BODY MASS INDEX: 33.59 KG/M2 | RESPIRATION RATE: 18 BRPM

## 2022-12-27 DIAGNOSIS — J02.9 ACUTE PHARYNGITIS, UNSPECIFIED ETIOLOGY: Primary | ICD-10-CM

## 2022-12-27 LAB — GROUP A STREP, MOLECULAR: NEGATIVE

## 2022-12-27 PROCEDURE — 96372 THER/PROPH/DIAG INJ SC/IM: CPT | Performed by: NURSE PRACTITIONER

## 2022-12-27 PROCEDURE — 63600175 PHARM REV CODE 636 W HCPCS: Performed by: NURSE PRACTITIONER

## 2022-12-27 PROCEDURE — 87651 STREP A DNA AMP PROBE: CPT | Performed by: NURSE PRACTITIONER

## 2022-12-27 PROCEDURE — 99284 EMERGENCY DEPT VISIT MOD MDM: CPT

## 2022-12-27 RX ORDER — DEXAMETHASONE SODIUM PHOSPHATE 4 MG/ML
8 INJECTION, SOLUTION INTRA-ARTICULAR; INTRALESIONAL; INTRAMUSCULAR; INTRAVENOUS; SOFT TISSUE
Status: COMPLETED | OUTPATIENT
Start: 2022-12-27 | End: 2022-12-27

## 2022-12-27 RX ORDER — BROMPHENIRAMINE MALEATE, PSEUDOEPHEDRINE HYDROCHLORIDE, AND DEXTROMETHORPHAN HYDROBROMIDE 2; 30; 10 MG/5ML; MG/5ML; MG/5ML
10 SYRUP ORAL
Qty: 118 ML | Refills: 0 | Status: SHIPPED | OUTPATIENT
Start: 2022-12-27 | End: 2023-01-03

## 2022-12-27 RX ORDER — AMOXICILLIN 500 MG/1
500 CAPSULE ORAL 3 TIMES DAILY
Qty: 30 CAPSULE | Refills: 0 | Status: SHIPPED | OUTPATIENT
Start: 2022-12-27 | End: 2023-01-06

## 2022-12-27 RX ADMIN — DEXAMETHASONE SODIUM PHOSPHATE 8 MG: 4 INJECTION, SOLUTION INTRA-ARTICULAR; INTRALESIONAL; INTRAMUSCULAR; INTRAVENOUS; SOFT TISSUE at 09:12

## 2022-12-27 NOTE — DISCHARGE INSTRUCTIONS
Take medications as directed, or you may take over-the-counter medications as directed for your symptoms.  Be sure to complete all antibiotics.  Alternate Tylenol and Motrin every 3 hours as directed for pain/fever.  Return to the emergency room for worsening condition.

## 2022-12-27 NOTE — ED PROVIDER NOTES
Encounter Date: 12/27/2022       History     Chief Complaint   Patient presents with    Sore Throat     Pt stated that since yesterday he has been experiencing sore throat (hard to swallow) since yesterday.      This is a 27-year-old black male with noncontributory past medical history who presents to the emergency department with complaints of sore throat that began yesterday.  He reports painful swallowing and postnasal drip as well.  He denies known fever, cough, vomiting, or diarrhea.  He denies known exposure to family members with similar symptoms, however patient was recently incarcerated.    Review of patient's allergies indicates:  No Known Allergies  History reviewed. No pertinent past medical history.  No past surgical history on file.  No family history on file.  Social History     Tobacco Use    Smoking status: Every Day     Packs/day: 0.50     Years: 12.00     Pack years: 6.00     Types: Vaping with nicotine, Cigarettes    Smokeless tobacco: Never   Substance Use Topics    Alcohol use: Yes     Alcohol/week: 0.0 standard drinks     Comment: very rarely, 1 drink    Drug use: Yes     Frequency: 7.0 times per week     Types: Marijuana     Comment: pt states he smokes marijuana daily     Review of Systems   Constitutional:  Negative for appetite change and fever.   HENT:  Positive for postnasal drip and sore throat. Negative for congestion.    Respiratory:  Negative for cough.    Cardiovascular: Negative.    Gastrointestinal:  Negative for diarrhea and vomiting.     Physical Exam     Initial Vitals [12/27/22 0952]   BP Pulse Resp Temp SpO2   132/77 64 18 98 °F (36.7 °C) 97 %      MAP       --         Physical Exam    Nursing note and vitals reviewed.  Constitutional: He appears well-developed and well-nourished. He is active. No distress.   HENT:   Head: Normocephalic and atraumatic.   Mouth/Throat: Uvula swelling present. Posterior oropharyngeal edema and posterior oropharyngeal erythema present. No  oropharyngeal exudate or tonsillar abscesses.   Eyes: EOM are normal. Pupils are equal, round, and reactive to light.   Neck: Neck supple.   Normal range of motion.  Cardiovascular:  Normal rate, regular rhythm and normal heart sounds.           Pulmonary/Chest: Breath sounds normal. No respiratory distress.   Musculoskeletal:         General: Normal range of motion.      Cervical back: Normal range of motion and neck supple.     Neurological: He is alert and oriented to person, place, and time. GCS score is 15. GCS eye subscore is 4. GCS verbal subscore is 5. GCS motor subscore is 6.   Skin: Skin is warm and dry. Capillary refill takes less than 2 seconds.   Psychiatric: He has a normal mood and affect. His behavior is normal. Thought content normal.       ED Course   Procedures  Labs Reviewed   GROUP A STREP, MOLECULAR          Imaging Results    None          Medications   dexAMETHasone injection 8 mg (8 mg Intramuscular Given 12/27/22 0956)                 ED Course as of 12/27/22 1014   Tue Dec 27, 2022   0958 Given exam findings the presence of anterior cervical lymphadenopathy will treat empirically for strep throat, test pending. [CB]      ED Course User Index  [CB] Fannie Rouse NP                 Clinical Impression:   Final diagnoses:  [J02.9] Acute pharyngitis, unspecified etiology (Primary)        ED Disposition Condition    Discharge Stable          ED Prescriptions       Medication Sig Dispense Start Date End Date Auth. Provider    amoxicillin (AMOXIL) 500 MG capsule Take 1 capsule (500 mg total) by mouth 3 (three) times daily. for 10 days 30 capsule 12/27/2022 1/6/2023 Fannie Rouse NP    brompheniramine-pseudoeph-DM (BROMFED DM) 2-30-10 mg/5 mL Syrp Take 10 mLs by mouth every 4 to 6 hours as needed (cough, cold symptoms). 118 mL 12/27/2022 1/3/2023 Fannie Rouse NP          Follow-up Information       Follow up With Specialties Details Why Contact Info    PCP Follow UP  Schedule an  appointment as soon as possible for a visit in 2 days for follow-up, for re-evaluation of today's complaint              Fannie Rouse NP  12/27/22 1019

## 2023-01-05 ENCOUNTER — HOSPITAL ENCOUNTER (EMERGENCY)
Facility: HOSPITAL | Age: 28
Discharge: HOME | End: 2023-01-05
Attending: EMERGENCY MEDICINE
Payer: MEDICAID

## 2023-01-05 VITALS
RESPIRATION RATE: 16 BRPM | OXYGEN SATURATION: 100 % | HEART RATE: 80 BPM | WEIGHT: 204 LBS | TEMPERATURE: 99 F | BODY MASS INDEX: 32.02 KG/M2 | DIASTOLIC BLOOD PRESSURE: 74 MMHG | SYSTOLIC BLOOD PRESSURE: 119 MMHG | HEIGHT: 67 IN

## 2023-01-05 DIAGNOSIS — J02.9 PHARYNGITIS, UNSPECIFIED ETIOLOGY: Primary | ICD-10-CM

## 2023-01-05 DIAGNOSIS — J06.9 VIRAL URI WITH COUGH: ICD-10-CM

## 2023-01-05 PROCEDURE — 63600175 PHARM REV CODE 636 W HCPCS

## 2023-01-05 PROCEDURE — 99284 EMERGENCY DEPT VISIT MOD MDM: CPT | Mod: 25

## 2023-01-05 PROCEDURE — 96372 THER/PROPH/DIAG INJ SC/IM: CPT

## 2023-01-05 RX ORDER — DEXAMETHASONE SODIUM PHOSPHATE 4 MG/ML
8 INJECTION, SOLUTION INTRA-ARTICULAR; INTRALESIONAL; INTRAMUSCULAR; INTRAVENOUS; SOFT TISSUE
Status: COMPLETED | OUTPATIENT
Start: 2023-01-05 | End: 2023-01-05

## 2023-01-05 RX ORDER — METHYLPREDNISOLONE 4 MG/1
TABLET ORAL
Qty: 21 EACH | Refills: 0 | Status: SHIPPED | OUTPATIENT
Start: 2023-01-05 | End: 2023-01-26

## 2023-01-05 RX ORDER — AZELASTINE 1 MG/ML
1 SPRAY, METERED NASAL 2 TIMES DAILY
Qty: 30 ML | Refills: 0 | Status: SHIPPED | OUTPATIENT
Start: 2023-01-05 | End: 2023-02-25 | Stop reason: SDUPTHER

## 2023-01-05 RX ORDER — FLUTICASONE PROPIONATE 50 MCG
1 SPRAY, SUSPENSION (ML) NASAL 2 TIMES DAILY PRN
Qty: 15 G | Refills: 0 | Status: SHIPPED | OUTPATIENT
Start: 2023-01-05 | End: 2023-02-25 | Stop reason: SDUPTHER

## 2023-01-05 RX ADMIN — DEXAMETHASONE SODIUM PHOSPHATE 8 MG: 4 INJECTION, SOLUTION INTRA-ARTICULAR; INTRALESIONAL; INTRAMUSCULAR; INTRAVENOUS; SOFT TISSUE at 08:01

## 2023-01-05 NOTE — Clinical Note
"Nicholas Menjivar" Zoeyyamilemargarita was seen and treated in our emergency department on 1/5/2023.  He may return to work on 01/09/2023.       If you have any questions or concerns, please don't hesitate to call.      Hardik Romo NP"

## 2023-01-06 NOTE — ED PROVIDER NOTES
Encounter Date: 1/5/2023       History     Chief Complaint   Patient presents with    Sore Throat     Pt reports recent cold with sore throat x 2-3 days.  Pt also complains of rt ear 'fullness'.       27-year-old male to ED with continued sore throat, cough, ear pain.  He was placed on amoxicillin on December 27th for presumed strep.  He reports no improvement with symptoms.  He did not take his amoxicillin today.  He denies any fevers, chest pain, shortness a breath.    The history is provided by the patient.   Review of patient's allergies indicates:  No Known Allergies  History reviewed. No pertinent past medical history.  History reviewed. No pertinent surgical history.  History reviewed. No pertinent family history.  Social History     Tobacco Use    Smoking status: Every Day     Packs/day: 0.50     Years: 12.00     Pack years: 6.00     Types: Vaping with nicotine, Cigarettes    Smokeless tobacco: Never   Substance Use Topics    Alcohol use: Yes     Alcohol/week: 0.0 standard drinks     Comment: very rarely, 1 drink    Drug use: Yes     Frequency: 7.0 times per week     Types: Marijuana     Comment: pt states he smokes marijuana daily     Review of Systems   Constitutional:  Negative for chills, fatigue and fever.   HENT:  Positive for congestion, ear pain and sore throat.    Eyes: Negative.    Respiratory:  Positive for cough. Negative for shortness of breath and wheezing.    Cardiovascular:  Negative for chest pain and palpitations.   Gastrointestinal:  Negative for abdominal pain, nausea and vomiting.   Endocrine: Negative.    Genitourinary:  Negative for difficulty urinating.   Musculoskeletal:  Negative for neck pain and neck stiffness.   Skin:  Negative for rash and wound.   Allergic/Immunologic: Negative.    Neurological:  Negative for headaches.   Hematological: Negative.    Psychiatric/Behavioral: Negative.       Physical Exam     Initial Vitals [01/05/23 2014]   BP Pulse Resp Temp SpO2   119/74 80 16  98.6 °F (37 °C) 100 %      MAP       --         Physical Exam    Nursing note and vitals reviewed.  Constitutional: He appears well-developed and well-nourished.   HENT:   Head: Normocephalic and atraumatic.   Right Ear: Tympanic membrane is bulging. Tympanic membrane is not injected, not perforated and not erythematous.   Left Ear: Tympanic membrane is bulging. Tympanic membrane is not injected, not perforated and not erythematous.   Mouth/Throat: Uvula is midline and mucous membranes are normal. No trismus in the jaw. Posterior oropharyngeal edema and posterior oropharyngeal erythema present. No oropharyngeal exudate or tonsillar abscesses.   Eyes: EOM are normal.   Neck: Neck supple.   Cardiovascular:  Normal rate and regular rhythm.           Pulmonary/Chest: Breath sounds normal. No respiratory distress. He has no wheezes.   Musculoskeletal:         General: Normal range of motion.      Cervical back: Neck supple.     Neurological: He is alert and oriented to person, place, and time.   Skin: Skin is warm and dry.   Psychiatric: He has a normal mood and affect. Thought content normal.       ED Course   Procedures  Labs Reviewed - No data to display       Imaging Results    None          Medications   dexAMETHasone injection 8 mg (8 mg Intramuscular Given 1/5/23 2029)     Medical Decision Making:   ED Management:  27-year-old male to ED for above complaints.  He was seen on December 27th the similar symptoms.  He reports being placed on amoxicillin, however does not appear that he takes some consistently.  His COVID was positive that day, however strep was negative.  He reports continued sore throat and congestion.  He has got some mild leak swollen tonsils.  They are equal bilaterally.  There is no exudate noted.  He had moderately swollen nasal turbinates.  His TMs were bulging with clear fluid behind it.  His lungs were clear in all fields.  He is speaking in full sentences.  No high potato voice noted.  I  gave him Decadron while in ED.  His sent home with a Medrol Dosepak.  I also sent home with azelastine and Flonase nasal sprays.  His instructed to take Tylenol and ibuprofen as needed for pain and/or fever.  He is instructed to follow primary care visit this did not improve.  He was stable for discharge.                        Clinical Impression:   Final diagnoses:  [J02.9] Pharyngitis, unspecified etiology (Primary)  [J06.9] Viral URI with cough        ED Disposition Condition    Discharge Stable          ED Prescriptions       Medication Sig Dispense Start Date End Date Auth. Provider    fluticasone propionate (FLONASE) 50 mcg/actuation nasal spray 1 spray (50 mcg total) by Each Nostril route 2 (two) times daily as needed. 15 g 1/5/2023 -- Hardik Romo NP    azelastine (ASTELIN) 137 mcg (0.1 %) nasal spray 1 spray (137 mcg total) by Nasal route 2 (two) times daily. 30 mL 1/5/2023 1/5/2024 Hardik Romo NP    methylPREDNISolone (MEDROL DOSEPACK) 4 mg tablet use as directed 21 each 1/5/2023 1/26/2023 Hardik Romo NP          Follow-up Information       Follow up With Specialties Details Why Contact Info    [facility] Psychology, Internal Medicine, Gynecology, Dental General Practice In 2 days  [address] [facility] [address]  [phone]               Hardik Romo NP  01/05/23 2054

## 2023-01-11 ENCOUNTER — PATIENT OUTREACH (OUTPATIENT)
Dept: EMERGENCY MEDICINE | Facility: HOSPITAL | Age: 28
End: 2023-01-11

## 2023-01-11 NOTE — PROGRESS NOTES
Mariya Huang, Patient Care Assistant  ED Navigator  Emergency Department    Project: [facility] ED Navigator  Role: Community Health Worker    Date: 01/11/2023  Patient Name: Nicholas Mckeon  MRN: [id number]  PCP: Primary Doctor No    Assessment:     Nicholas Mckeon is a 27 y.o. male who has presented to ED for a sore throat. Patient has visited the ED 2 times in the past 3 months. Patient does not have a PCP.     ED Navigator Initial Assessment    ED Navigator Enrollment Documentation  Consent to Services  Does patient consent to completing the assessment?: Yes  Contact  Method of Initial Contact: Phone  Transportation  Does the patient have issues with Transportation?: No  Does the patient have transportation to and from healthcare appointments?: Yes  Insurance Coverage  Do you have coverage/adequate coverage?: Yes  Type/kind of coverage: MEDICAID OF [place]  Is patient able to afford co-pays/deductibles?: Yes  Is patient able to afford HME or supplies?: Yes  Does patient have an established [facility] PCP?: No  Does patient need assistance finding a PCP?: Yes  Does the patient have a lack of adequate coverage?: No  Specialist Appointment  Did the patient come to the ED to see a specialist?: No  Does the patient have a pending specialist referral?: No  Does the patient have a specialist appointment made?: No  PCP Follow Up Appointment  Has the patient had an appointment with a primary care provider in the past year?: No  Does the patient have a follow up appontment with a PCP?: Yes  Upcoming appointment date: 1/17/23  Provider: Ana Mo, DO  When was the last time you saw your PCP?:  (Comment: N/A)  Medications  Is patient able to afford medication?: Yes  Is patient unable to get medication due to lack of transportation?: No  Psychological  Does the patient have psycho-social concerns?: No  Food  Does the patient have concerns about food?: No  Communication/Education  Does the patient have limited English  proficiency/English not primary language?: No  Does patient have low literacy and/or low health literacy?: Yes  Does patient have concerns with care?: No  Does patient have dissatisfaction with care?: No  Other Financial Concerns  Does the patient have immediate financial distress?: No  Does the patient have general financial concerns?: No  Other Social Barriers/Concerns  Does the patient have any additional barriers or concerns?: None  Primary Barrier  Barriers identified: Cognitive barrier (health literacy, language and communication, etc.), Structural barrier (service availability, waiting times, etc.)  Root Cause of ED Utilization: Lack of Access to Primary Care  Plan to address Lack of Access to Primary Care: Provided information for [facility] 24/7 Nurse Triage line [phone] or 1-866-[facility] ([phone])  Next steps: Provided Education, Scheduled Appointment/Referral  Scheduled Appointment Date: 1/17/23  Appointment Reminder Date: 1/13/23  Was education/educational materials provided surrounding PCP services/creating a medical home?: Yes Was education verbal or written?: Verbal, Written     Was education/educational materials provided surrounding low cost, healthy foods?: Yes Was education verbal or written?: Written     Was education/educational materials provided surrounding other items? If so, use comment to explain.: Yes Was education verbal or written?: Written   Plan: Provided information for [facility] 24/7 Nurse triage line, [phone] or [facility] ([phone])  Expected Date of Follow Up 1: 1/13/23  Additional Documentation: Patient expressed he does not have a PCP and would actually need to look for one. Patient stated his sore throat is gone, but is still experiencing ear fullness, and that something may be in there. Patient agrees to be assisted with scheduling an appointment and agreed to the date/time scheduled. Patient identified that scheduling an appointment is the  only assistance he needs. Patient provided his e-mail address [email or web address]. Patient is agreeable to a follow-up call in a couple of days.    ED navigator ensured to assist patient with his needs. ED navigator scheduled patient with Dr. Mo for 1/17/2023 at 10:40 a.m., as this was the next available appointment. ED navigator provided patient with the following via e-mail: her contact information, his appointment details, information on why having a PCP is important, the [facility] 24/7 Nurse triage line, [phone] or [facility] ([phone]) contact information, and education on (The Right Care at the Right Level information, [facility] Virtual Visit information, and Heart healthy diet tips). ED navigator will follow-up with patient on/around 1/13/2023 to ensure he received the appointment details, to ensure his appointment date/time still works, and to assist as needed.    Mariya Huang  ED Navigator- [facility]  [phone]           Social History     Socioeconomic History    Marital status: Significant Other   Tobacco Use    Smoking status: Every Day     Packs/day: 0.50     Years: 12.00     Pack years: 6.00     Types: Vaping with nicotine, Cigarettes    Smokeless tobacco: Never   Substance and Sexual Activity    Alcohol use: Yes     Alcohol/week: 0.0 standard drinks     Comment: very rarely, 1 drink    Drug use: Yes     Frequency: 7.0 times per week     Types: Marijuana     Comment: pt states he smokes marijuana daily    Sexual activity: Yes     Partners: Female     Social Determinants of Health     Financial Resource Strain: Low Risk     Difficulty of Paying Living Expenses: Not hard at all   Food Insecurity: No Food Insecurity    Worried About Running Out of Food in the Last Year: Never true    Ran Out of Food in the Last Year: Never true   Transportation Needs: No Transportation Needs    Lack of Transportation (Medical): No    Lack of Transportation  (Non-Medical): No   Physical Activity: Sufficiently Active    Days of Exercise per Week: 5 days    Minutes of Exercise per Session: 60 min   Stress: No Stress Concern Present    Feeling of Stress : Not at all   Social Connections: Moderately Isolated    Frequency of Communication with Friends and Family: More than three times a week    Frequency of Social Gatherings with Friends and Family: More than three times a week    Attends [employer] Services: Never    Active Member of Clubs or Organizations: No    Attends Club or Organization Meetings: Never    Marital Status: Living with partner   Housing Stability: Low Risk     Unable to Pay for Housing in the Last Year: No    Number of Places Lived in the Last Year: 1    Unstable Housing in the Last Year: No       Plan:       Patient expressed he does not have a PCP and would actually need to look for one. Patient stated his sore throat is gone, but is still experiencing ear fullness, and that something may be in there. Patient agrees to be assisted with scheduling an appointment and agreed to the date/time scheduled. Patient identified that scheduling an appointment is the only assistance he needs. Patient provided his e-mail address [email or web address]. Patient is agreeable to a follow-up call in a couple of days.    ED navigator ensured to assist patient with his needs. ED navigator scheduled patient with Dr. Mo for 1/17/2023 at 10:40 a.m., as this was the next available appointment. ED navigator provided patient with the following via e-mail: her contact information, his appointment details, information on why having a PCP is important, the [facility] 24/7 Nurse triage line, [phone] or [facility] ([phone]) contact information, and education on (The Right Care at the Right Level information, [facility] Virtual Visit information, and Heart healthy diet tips). ED navigator will follow-up with patient on/around 1/13/2023 to ensure he received  the appointment details, to ensure his appointment date/time still works, and to assist as needed.    Mariya Huang  ED Navigator- [place]  [phone]

## 2023-01-13 ENCOUNTER — PATIENT OUTREACH (OUTPATIENT)
Dept: EMERGENCY MEDICINE | Facility: HOSPITAL | Age: 28
End: 2023-01-13

## 2023-01-13 NOTE — PROGRESS NOTES
ED navigator reminded patient about his appointment for 1/17/23 with Dr. Mo at 10:40 a.m. Patient stated he has the address and will be attending.    Mariya Huang  ED Navigator- [place]  [phone]

## 2023-01-13 NOTE — PROGRESS NOTES
Patient has all he needs at this time.    ED navigator ensured patient had no other needs at this time. ED navigator will follow-up with patient on/around 1/24/2023 to see how his appointment went, and to assist as needed.    Mariya Huang  ED Navigator- [place]  [phone]

## 2023-01-24 ENCOUNTER — PATIENT OUTREACH (OUTPATIENT)
Dept: EMERGENCY MEDICINE | Facility: HOSPITAL | Age: 28
End: 2023-01-24

## 2023-02-25 ENCOUNTER — HOSPITAL ENCOUNTER (EMERGENCY)
Facility: HOSPITAL | Age: 28
Discharge: HOME | End: 2023-02-25
Attending: EMERGENCY MEDICINE

## 2023-02-25 VITALS
RESPIRATION RATE: 18 BRPM | HEART RATE: 106 BPM | TEMPERATURE: 98 F | BODY MASS INDEX: 30.62 KG/M2 | HEIGHT: 68 IN | OXYGEN SATURATION: 98 % | SYSTOLIC BLOOD PRESSURE: 145 MMHG | WEIGHT: 202 LBS | DIASTOLIC BLOOD PRESSURE: 95 MMHG

## 2023-02-25 DIAGNOSIS — J06.9 UPPER RESPIRATORY TRACT INFECTION, UNSPECIFIED TYPE: Primary | ICD-10-CM

## 2023-02-25 DIAGNOSIS — Z72.0 CONTINUOUS TOBACCO ABUSE: ICD-10-CM

## 2023-02-25 DIAGNOSIS — R05.9 COUGH, UNSPECIFIED TYPE: ICD-10-CM

## 2023-02-25 PROCEDURE — 99281 EMR DPT VST MAYX REQ PHY/QHP: CPT | Mod: 25

## 2023-02-25 RX ORDER — FLUTICASONE PROPIONATE 50 MCG
1 SPRAY, SUSPENSION (ML) NASAL 2 TIMES DAILY PRN
Qty: 15 G | Refills: 0 | Status: SHIPPED | OUTPATIENT
Start: 2023-02-25

## 2023-02-25 RX ORDER — AZELASTINE 1 MG/ML
1 SPRAY, METERED NASAL 2 TIMES DAILY
Qty: 30 ML | Refills: 0 | Status: SHIPPED | OUTPATIENT
Start: 2023-02-25 | End: 2024-02-25

## 2023-02-25 NOTE — ED NOTES
NEUROLOGICAL:   Patient is awake , alert , and oriented x 4 . Pupils are PERRL. Gait is steady.   Moves all extremities without difficulty.   Patient reports no neuro complaints..  GCS 15    HEENT:   Head appears normocephalic  and symmetric .   Eyes appear WNL to both eyes. Patient reports no complaints  to both eyes .   Ears appear WNL. Patient reports no complaints  to both ears.   Nares appear patent . Patient reports no nose complaints .  Mouth appears moist, pink, and teeth intact. Patient reports no mouth complaints.   Throat appears pink and moist . Patient reports no throat complaints.    CARDIOVASCULAR:   S1 and S2 present, no murmurs, gallops, or rubs, rate regular , and pulses palpable (2+)    On palpation no edema noted , noted to none.   Patient reports no CV complaints.  .   Patient vitals are NWNL, BP elevated, reports he does not take any meds and did not get his last ED Rx's filled..    RESPIRATORY:   Airway Clear, Open, and Patent.  Respirations are even and unlabored.   Breath sounds clear  to all lung fields.   Patient reports non productive cough.     GASTROINTESTINAL:   Abdomen is soft  and non-tender x 4 quadrants. Bowel sounds are normoactive to all quadrants .   Patient reports no GI complaints .     GENITOURINARY:   Patient reports no  complaints.     MUSCULOSKELETAL:   full range of motion to all extremities, no swelling noted , no tenderness noted, and no weakness noted.   Patient reports no musculoskeletal complaints     SKIN:   Skin appears warm , dry , good turgor, color normal for race, and intact. Patient reports no skin complaints.

## 2023-02-25 NOTE — DISCHARGE INSTRUCTIONS
Stop smoking.  Use the nasal sprays that I sent to the pharmacy.  You can also use over-the-counter cough drops.  Take Tylenol and ibuprofen for any pain, fever, headaches.  Follow up with primary care if his symptoms do not improve.

## 2023-02-25 NOTE — ED PROVIDER NOTES
Encounter Date: 2/25/2023       History     Chief Complaint   Patient presents with    Cough     Pt stated that he has been experiencing cough / congestion / headache for the past 2 days.      27-year-old male to ED for cough, congestion for 2 days.  He reports family members with similar symptoms.  Everybody tested negative for COVID.  Denies any fever.  Give attempted any medications or treatments at home.  Denies any chest pain or shortness a breath.    The history is provided by the patient.   Review of patient's allergies indicates:  No Known Allergies  History reviewed. No pertinent past medical history.  No past surgical history on file.  No family history on file.  Social History     Tobacco Use    Smoking status: Every Day     Packs/day: 0.50     Years: 12.00     Pack years: 6.00     Types: Vaping with nicotine, Cigarettes    Smokeless tobacco: Never   Substance Use Topics    Alcohol use: Yes     Alcohol/week: 0.0 standard drinks     Comment: very rarely, 1 drink    Drug use: Yes     Frequency: 7.0 times per week     Types: Marijuana     Comment: pt states he smokes marijuana daily     Review of Systems   Constitutional:  Negative for fever.   HENT:  Positive for congestion and rhinorrhea. Negative for sore throat.    Eyes: Negative.    Respiratory:  Positive for cough. Negative for shortness of breath.    Cardiovascular:  Negative for chest pain and palpitations.   Gastrointestinal:  Negative for nausea.   Endocrine: Negative.    Genitourinary:  Negative for dysuria.   Musculoskeletal:  Negative for back pain.   Skin:  Negative for rash and wound.   Allergic/Immunologic: Negative.    Neurological:  Negative for weakness.   Hematological:  Does not bruise/bleed easily.   Psychiatric/Behavioral: Negative.       Physical Exam     Initial Vitals   BP Pulse Resp Temp SpO2   02/25/23 1023 02/25/23 1022 02/25/23 1022 02/25/23 1022 02/25/23 1022   (!) 145/95 106 18 98.1 °F (36.7 °C) 98 %      MAP       --                 Physical Exam    Nursing note and vitals reviewed.  Constitutional: He appears well-developed and well-nourished.   HENT:   Head: Normocephalic and atraumatic.   Nose: Mucosal edema and rhinorrhea present.   Mouth/Throat: Uvula is midline and mucous membranes are normal. Posterior oropharyngeal erythema present. No oropharyngeal exudate, posterior oropharyngeal edema or tonsillar abscesses.   Eyes: EOM are normal.   Neck: Neck supple.   Normal range of motion.  Cardiovascular:  Normal rate and regular rhythm.           Pulmonary/Chest: Breath sounds normal. No respiratory distress.   Abdominal: He exhibits no distension.   Musculoskeletal:         General: Normal range of motion.      Cervical back: Normal range of motion and neck supple.     Neurological: He is alert and oriented to person, place, and time.   Skin: Skin is warm and dry.   Psychiatric: He has a normal mood and affect. Thought content normal.       ED Course   Procedures  Labs Reviewed - No data to display       Imaging Results    None          Medications - No data to display  Medical Decision Making:   History:   Old Medical Records: I decided to obtain old medical records.  ED Management:  27-year-old male with no significant past medical history presents to ED for above complaints.  He was in everyday tobacco and marijuana smoker.  I have seen him in ED for similar complaint complaints before.  He was given prescriptions for Flonase and azelastine, however he states that his medicated not cover them and he could not afford them.  Patient reports he was smokes 2-1/2 packs of cigarettes per week, which I told him he could use that money to pay for his medications.  There are no signs of respiratory distress noted.  His lungs are clear in all fields.  He was afebrile in ED.  He was not hypoxic.  I recent his prescriptions to the pharmacy.  I counseled him on smoking cessation.  He was stable for discharge.                        Clinical  Impression:   Final diagnoses:  [J06.9] Upper respiratory tract infection, unspecified type (Primary)  [R05.9] Cough, unspecified type  [Z72.0] Continuous tobacco abuse        ED Disposition Condition    Discharge Stable          ED Prescriptions       Medication Sig Dispense Start Date End Date Auth. Provider    azelastine (ASTELIN) 137 mcg (0.1 %) nasal spray 1 spray (137 mcg total) by Nasal route 2 (two) times daily. 30 mL 2/25/2023 2/25/2024 Hardik Romo NP    fluticasone propionate (FLONASE) 50 mcg/actuation nasal spray 1 spray (50 mcg total) by Each Nostril route 2 (two) times daily as needed for Rhinitis. 15 g 2/25/2023 -- Hardik Romo NP          Follow-up Information    None          Hardik Romo NP  02/25/23 1031

## 2023-03-01 ENCOUNTER — PATIENT OUTREACH (OUTPATIENT)
Dept: EMERGENCY MEDICINE | Facility: HOSPITAL | Age: 28
End: 2023-03-01

## 2023-03-01 NOTE — PROGRESS NOTES
Pt was e-mailed:    Good afternoon Mr. Peterson,    I just tried reaching you again to follow-up with you from the last time we had spoken, and due to you coming into the ER recently (I wanted to see how you were doing), but was unable to leave a voicemail. I have been trying for some time now, and have not been successful. If you can please return my call at the bottom of this e-mail, I would appreciate it.    Pt is unreachable. Encounter will be closed, and pt should be contacted if/when he/she comes back to the ER again for a F/U call.    Mariya Huang ED Navigator- [place]  [phone]

## 2023-03-01 NOTE — PROGRESS NOTES
Pt is unreachable for 2nd F/U. ED navigator will follow-up with patient on/around 3/1/2023 for attempt at 3rd.    Mairya Huang  ED Navigator- [place]  [phone]

## 2023-07-22 ENCOUNTER — HOSPITAL ENCOUNTER (EMERGENCY)
Facility: HOSPITAL | Age: 28
Discharge: HOME | End: 2023-07-22
Attending: EMERGENCY MEDICINE
Payer: MEDICAID

## 2023-07-22 VITALS
TEMPERATURE: 101 F | HEART RATE: 101 BPM | OXYGEN SATURATION: 97 % | BODY MASS INDEX: 29.07 KG/M2 | SYSTOLIC BLOOD PRESSURE: 128 MMHG | WEIGHT: 191.81 LBS | RESPIRATION RATE: 20 BRPM | DIASTOLIC BLOOD PRESSURE: 81 MMHG | HEIGHT: 68 IN

## 2023-07-22 DIAGNOSIS — U07.1 COVID: Primary | ICD-10-CM

## 2023-07-22 LAB
CTP QC/QA: YES
SARS-COV-2 RDRP RESP QL NAA+PROBE: POSITIVE

## 2023-07-22 PROCEDURE — 99284 EMERGENCY DEPT VISIT MOD MDM: CPT

## 2023-07-22 PROCEDURE — 87635 SARS-COV-2 COVID-19 AMP PRB: CPT | Performed by: CLINICAL NURSE SPECIALIST

## 2023-07-22 PROCEDURE — 25000003 PHARM REV CODE 250: Performed by: CLINICAL NURSE SPECIALIST

## 2023-07-22 RX ORDER — PROMETHAZINE HYDROCHLORIDE AND DEXTROMETHORPHAN HYDROBROMIDE 6.25; 15 MG/5ML; MG/5ML
10 SYRUP ORAL EVERY 6 HOURS PRN
Qty: 120 ML | Refills: 0 | Status: SHIPPED | OUTPATIENT
Start: 2023-07-22 | End: 2023-08-01

## 2023-07-22 RX ORDER — DIPHENOXYLATE HYDROCHLORIDE AND ATROPINE SULFATE 2.5; .025 MG/1; MG/1
1 TABLET ORAL 4 TIMES DAILY PRN
Qty: 10 TABLET | Refills: 0 | Status: SHIPPED | OUTPATIENT
Start: 2023-07-22 | End: 2023-08-01

## 2023-07-22 RX ORDER — IBUPROFEN 800 MG/1
800 TABLET ORAL
Status: COMPLETED | OUTPATIENT
Start: 2023-07-22 | End: 2023-07-22

## 2023-07-22 RX ORDER — IBUPROFEN 800 MG/1
800 TABLET ORAL EVERY 6 HOURS PRN
Qty: 20 TABLET | Refills: 0 | Status: SHIPPED | OUTPATIENT
Start: 2023-07-22

## 2023-07-22 RX ORDER — ONDANSETRON 4 MG/1
4 TABLET, ORALLY DISINTEGRATING ORAL EVERY 6 HOURS PRN
Qty: 10 TABLET | Refills: 0 | Status: SHIPPED | OUTPATIENT
Start: 2023-07-22

## 2023-07-22 RX ADMIN — IBUPROFEN 800 MG: 800 TABLET, FILM COATED ORAL at 02:07

## 2023-07-22 NOTE — ED PROVIDER NOTES
Encounter Date: 7/22/2023       History     Chief Complaint   Patient presents with    COVID-19 Concerns     Patient to the ER with complaints of eye pressure, nasal congestion, cough, vomiting, and diarrhea onset last night. Reports Covid exposure.     28-year-old male presents to the emergency room with eye pressure, nasal congestion, coughing, vomiting which began last night.  Positive COVID exposure.  Temperature in triage was 100.8, did not take any medication prior to arrival      Review of patient's allergies indicates:  No Known Allergies  History reviewed. No pertinent past medical history.  History reviewed. No pertinent surgical history.  History reviewed. No pertinent family history.  Social History     Tobacco Use    Smoking status: Every Day     Packs/day: 0.50     Years: 12.00     Pack years: 6.00     Types: Vaping with nicotine, Cigarettes    Smokeless tobacco: Never   Substance Use Topics    Alcohol use: Yes     Alcohol/week: 0.0 standard drinks     Comment: very rarely, 1 drink    Drug use: Yes     Frequency: 7.0 times per week     Types: Marijuana     Comment: pt states he smokes marijuana daily     Review of Systems   Constitutional:  Negative for fever.   HENT:  Positive for congestion. Negative for sore throat.    Eyes:  Positive for pain.   Respiratory:  Negative for shortness of breath.    Cardiovascular:  Negative for chest pain.   Gastrointestinal:  Positive for diarrhea, nausea and vomiting.   Genitourinary:  Negative for dysuria.   Musculoskeletal:  Negative for back pain.   Skin:  Negative for rash.   Neurological:  Negative for weakness.   Hematological:  Does not bruise/bleed easily.   All other systems reviewed and are negative.    Physical Exam     Initial Vitals [07/22/23 1344]   BP Pulse Resp Temp SpO2   128/81 101 20 (!) 100.8 °F (38.2 °C) 97 %      MAP       --         Physical Exam    Nursing note and vitals reviewed.  Constitutional: He appears well-developed and  well-nourished.   HENT:   Head: Normocephalic and atraumatic.   Right Ear: Tympanic membrane is erythematous.   Left Ear: Tympanic membrane is erythematous.   Mouth/Throat: Posterior oropharyngeal erythema present.   Eyes: Pupils are equal, round, and reactive to light.   Cardiovascular:  Normal rate and regular rhythm.           Pulmonary/Chest: Breath sounds normal.   Abdominal: Abdomen is soft. Bowel sounds are normal.   Musculoskeletal:         General: Normal range of motion.     Neurological: He is alert and oriented to person, place, and time.   Psychiatric: He has a normal mood and affect.       ED Course   Procedures  Labs Reviewed   SARS-COV-2 RDRP GENE - Abnormal; Notable for the following components:       Result Value    POC Rapid COVID Positive (*)     All other components within normal limits    Narrative:     This test utilizes isothermal nucleic acid amplification technology to detect the SARS-CoV-2 RdRp nucleic acid segment. The analytical sensitivity (limit of detection) is 500 copies/swab.     A POSITIVE result is indicative of the presence of SARS-CoV-2 RNA; clinical correlation with patient history and other diagnostic information is necessary to determine patient infection status.    A NEGATIVE result means that SARS-CoV-2 nucleic acids are not present above the limit of detection. A NEGATIVE result should be treated as presumptive. It does not rule out the possibility of COVID-19 and should not be the sole basis for treatment decisions. If COVID-19 is strongly suspected based on clinical and exposure history, re-testing using an alternate molecular assay should be considered.     This test is only for use under the Food and Drug Administration s Emergency Use Authorization (EUA).     Commercial kits are provided by [employer]. Performance characteristics of the EUA have been independently verified by [facility] Department of Pathology and Laboratory Medicine.    _________________________________________________________________   The authorized Fact Sheet for Healthcare Providers and the authorized Fact Sheet for Patients of the ID NOW COVID-19 are available on the FDA website:    https://www.fda.gov/media/965155/download      https://www.fda.gov/media/907855/download             Imaging Results    None          Medications   ibuprofen tablet 800 mg (800 mg Oral Given 7/22/23 1401)     Medical Decision Making:   Differential Diagnosis:   COVID, sinusitis  Clinical Tests:   Lab Tests: Ordered and Reviewed                        Clinical Impression:   Final diagnoses:  [U07.1] COVID (Primary)        ED Disposition Condition    Discharge Stable          ED Prescriptions       Medication Sig Dispense Start Date End Date Auth. Provider    ibuprofen (ADVIL,MOTRIN) 800 MG tablet Take 1 tablet (800 mg total) by mouth every 6 (six) hours as needed for Pain. 20 tablet 7/22/2023 -- Shawanda Thompson NP    diphenoxylate-atropine 2.5-0.025 mg (LOMOTIL) 2.5-0.025 mg per tablet Take 1 tablet by mouth 4 (four) times daily as needed for Diarrhea. 10 tablet 7/22/2023 8/1/2023 Shawanda Thompson NP    ondansetron (ZOFRAN-ODT) 4 MG TbDL Take 1 tablet (4 mg total) by mouth every 6 (six) hours as needed. 10 tablet 7/22/2023 -- Shawanda Thompson NP    promethazine-dextromethorphan (PROMETHAZINE-DM) 6.25-15 mg/5 mL Syrp Take 10 mLs by mouth every 6 (six) hours as needed (cough). 120 mL 7/22/2023 8/1/2023 Shawanda Thompson NP          Follow-up Information    None          Shawanda Thompson NP  07/22/23 1171

## 2023-07-22 NOTE — Clinical Note
"Nicholas"Aidee Mckeon was seen and treated in our emergency department on 7/22/2023.     COVID-19 is present in our communities across the state. There is limited testing for COVID at this time, so not all patients can be tested. In this situation, your employee meets the following criteria:    Nicholas Mckeon has met the criteria for COVID-19 testing and has a POSITIVE result. He can return to work once they are asymptomatic for 24 hours without the use of fever reducing medications AND at least five days from the first positive result. A mask is recommended for 5 days post quarantine.     If you have any questions or concerns, or if I can be of further assistance, please do not hesitate to contact me.    Sincerely,             Dakotah Valero MD"

## 2023-07-22 NOTE — DISCHARGE INSTRUCTIONS
COVID positive.  Tylenol and ibuprofen as needed for headache, body aches, fever.  Quarantine for 5 days.

## 2024-01-06 ENCOUNTER — HOSPITAL ENCOUNTER (EMERGENCY)
Facility: HOSPITAL | Age: 29
Discharge: HOME | End: 2024-01-06
Attending: EMERGENCY MEDICINE
Payer: MEDICAID

## 2024-01-06 VITALS
SYSTOLIC BLOOD PRESSURE: 137 MMHG | TEMPERATURE: 99 F | HEIGHT: 68 IN | RESPIRATION RATE: 18 BRPM | OXYGEN SATURATION: 100 % | DIASTOLIC BLOOD PRESSURE: 90 MMHG | WEIGHT: 191.19 LBS | HEART RATE: 80 BPM | BODY MASS INDEX: 28.98 KG/M2

## 2024-01-06 DIAGNOSIS — Z72.0 TOBACCO USE: ICD-10-CM

## 2024-01-06 DIAGNOSIS — H66.92 LEFT OTITIS MEDIA, UNSPECIFIED OTITIS MEDIA TYPE: Primary | ICD-10-CM

## 2024-01-06 PROCEDURE — 63600175 PHARM REV CODE 636 W HCPCS: Performed by: EMERGENCY MEDICINE

## 2024-01-06 PROCEDURE — 96372 THER/PROPH/DIAG INJ SC/IM: CPT | Performed by: EMERGENCY MEDICINE

## 2024-01-06 PROCEDURE — 25000003 PHARM REV CODE 250: Performed by: EMERGENCY MEDICINE

## 2024-01-06 PROCEDURE — 99284 EMERGENCY DEPT VISIT MOD MDM: CPT

## 2024-01-06 RX ORDER — KETOROLAC TROMETHAMINE 30 MG/ML
30 INJECTION, SOLUTION INTRAMUSCULAR; INTRAVENOUS
Status: COMPLETED | OUTPATIENT
Start: 2024-01-06 | End: 2024-01-06

## 2024-01-06 RX ORDER — NAPROXEN 500 MG/1
500 TABLET ORAL EVERY 12 HOURS PRN
Qty: 20 TABLET | Refills: 0 | Status: SHIPPED | OUTPATIENT
Start: 2024-01-06

## 2024-01-06 RX ORDER — OXYMETAZOLINE HCL 0.05 %
1 SPRAY, NON-AEROSOL (ML) NASAL 2 TIMES DAILY PRN
Qty: 15 ML | Refills: 0 | Status: SHIPPED | OUTPATIENT
Start: 2024-01-06 | End: 2024-01-09

## 2024-01-06 RX ORDER — AMOXICILLIN 250 MG/1
500 CAPSULE ORAL
Status: COMPLETED | OUTPATIENT
Start: 2024-01-06 | End: 2024-01-06

## 2024-01-06 RX ORDER — ACETAMINOPHEN 500 MG
1000 TABLET ORAL
Status: COMPLETED | OUTPATIENT
Start: 2024-01-06 | End: 2024-01-06

## 2024-01-06 RX ORDER — FLUTICASONE PROPIONATE 50 MCG
2 SPRAY, SUSPENSION (ML) NASAL 2 TIMES DAILY
Qty: 15.8 ML | Refills: 0 | Status: SHIPPED | OUTPATIENT
Start: 2024-01-06

## 2024-01-06 RX ORDER — AMOXICILLIN 500 MG/1
1000 CAPSULE ORAL EVERY 12 HOURS
Qty: 28 CAPSULE | Refills: 0 | Status: SHIPPED | OUTPATIENT
Start: 2024-01-06 | End: 2024-01-13

## 2024-01-06 RX ADMIN — AMOXICILLIN 500 MG: 250 CAPSULE ORAL at 10:01

## 2024-01-06 RX ADMIN — KETOROLAC TROMETHAMINE 30 MG: 30 INJECTION, SOLUTION INTRAMUSCULAR; INTRAVENOUS at 10:01

## 2024-01-06 RX ADMIN — ACETAMINOPHEN 1000 MG: 500 TABLET ORAL at 10:01

## 2024-01-07 NOTE — ED PROVIDER NOTES
EMERGENCY DEPARTMENT HISTORY AND PHYSICAL EXAM     This note is dictated on M*Modal word recognition program.  There are word recognition mistakes and grammatical errors that are occasionally missed on review.     Date: 1/6/2024   Patient Name: Nicholas Mckeon       History of Presenting Illness      Chief Complaint   Patient presents with    Otalgia     Pt presents to ED complaining of left ear ache and a sore throat x 3 days. Pt is grasping his ear in outmost distress and on ED arrival. Denies taking any OTC medications to help alleviate symptoms. No fever.         2230   Nicholas Mckeon is a 28 y.o. male with PMHX of pharyngitis, otitis who presents to the emergency department C/O ear pain.    Patient reports left-sided ear 3 days headache and sore throat.  Patient has not tried anything for this.  No fever.  Patient requesting pain medication      PCP: Lorena, Primary Doctor        Current Facility-Administered Medications   Medication Dose Route Frequency Provider Last Rate Last Admin    acetaminophen tablet 1,000 mg  1,000 mg Oral ED 1 Time Pedro Martinez MD        amoxicillin capsule 500 mg  500 mg Oral ED 1 Time Pedro Martinez MD        ketorolac injection 30 mg  30 mg Intramuscular ED 1 Time Pedro Martinez MD         Current Outpatient Medications   Medication Sig Dispense Refill    amoxicillin (AMOXIL) 500 MG capsule Take 2 capsules (1,000 mg total) by mouth every 12 (twelve) hours. for 7 days 28 capsule 0    azelastine (ASTELIN) 137 mcg (0.1 %) nasal spray 1 spray (137 mcg total) by Nasal route 2 (two) times daily. 30 mL 0    fluticasone propionate (FLONASE) 50 mcg/actuation nasal spray 1 spray (50 mcg total) by Each Nostril route 2 (two) times daily as needed for Rhinitis. 15 g 0    fluticasone propionate (FLONASE) 50 mcg/actuation nasal spray 2 sprays (100 mcg total) by Each Nostril route 2 (two) times a day. 15.8 mL 0    ibuprofen (ADVIL,MOTRIN) 800 MG tablet Take 1 tablet (800  "mg total) by mouth every 6 (six) hours as needed for Pain. 20 tablet 0    naproxen (NAPROSYN) 500 MG tablet Take 1 tablet (500 mg total) by mouth every 12 (twelve) hours as needed (Pain). 20 tablet 0    naproxen (NAPROSYN) 500 MG tablet Take 1 tablet (500 mg total) by mouth every 12 (twelve) hours as needed (Pain). 20 tablet 0    ondansetron (ZOFRAN) 4 MG tablet Take 1 tablet (4 mg total) by mouth every 6 (six) hours. 12 tablet 0    ondansetron (ZOFRAN-ODT) 4 MG TbDL Take 1 tablet (4 mg total) by mouth every 6 (six) hours as needed. 10 tablet 0    oxymetazoline (AFRIN) 0.05 % nasal spray 1 spray by Nasal route 2 (two) times daily as needed for Congestion. 15 mL 0           Past History     Past Medical History:   No past medical history on file.     Past Surgical History:   No past surgical history on file.     Family History:   No family history on file.     Social History:   Social History     Tobacco Use    Smoking status: Every Day     Current packs/day: 0.50     Average packs/day: 0.5 packs/day for 12.0 years (6.0 ttl pk-yrs)     Types: Vaping with nicotine, Cigarettes    Smokeless tobacco: Never   Substance Use Topics    Alcohol use: Yes     Alcohol/week: 0.0 standard drinks of alcohol     Comment: very rarely, 1 drink    Drug use: Yes     Frequency: 7.0 times per week     Types: Marijuana     Comment: pt states he smokes marijuana daily        Allergies:   Review of patient's allergies indicates:  No Known Allergies       Review of Systems   Review of Systems   See HPI for pertinent positives and negatives       Physical Exam     Vitals:    01/06/24 2210   BP: (!) 137/90   Pulse: 80   Resp: 18   Temp: 98.6 °F (37 °C)   TempSrc: Oral   SpO2: 100%   Weight: 86.7 kg (191 lb 3.2 oz)   Height: 5' 8" (1.727 m)      Physical Exam  Vitals and nursing note reviewed.   Constitutional:       General: He is not in acute distress.     Appearance: He is well-developed. He is not ill-appearing.      Comments: Sleeping when " I entered the room   HENT:      Head: Normocephalic and atraumatic.      Jaw: There is normal jaw occlusion.      Right Ear: Tympanic membrane is injected.      Left Ear: Tympanic membrane is injected and erythematous. Tympanic membrane is not perforated.      Nose: No congestion or rhinorrhea.      Mouth/Throat:      Pharynx: Pharyngeal swelling present. No oropharyngeal exudate, posterior oropharyngeal erythema or uvula swelling.      Tonsils: No tonsillar exudate or tonsillar abscesses.   Eyes:      Extraocular Movements: Extraocular movements intact.      Conjunctiva/sclera: Conjunctivae normal.   Pulmonary:      Effort: Pulmonary effort is normal. No respiratory distress.   Musculoskeletal:         General: No deformity or signs of injury. Normal range of motion.      Cervical back: Normal range of motion. No rigidity.   Skin:     General: Skin is dry.      Coloration: Skin is not pale.      Findings: No rash.   Neurological:      General: No focal deficit present.      Mental Status: He is alert and oriented to person, place, and time.      Cranial Nerves: No cranial nerve deficit.      Motor: No weakness.      Coordination: Coordination normal.              Diagnostic Study Results      Labs -   No results found for this or any previous visit (from the past 12 hour(s)).     Radiologic Studies -    No orders to display        Medications given in the ED-   Medications   ketorolac injection 30 mg (has no administration in time range)   acetaminophen tablet 1,000 mg (has no administration in time range)   amoxicillin capsule 500 mg (has no administration in time range)           Medical Decision Making    I am the first provider for this patient.     I reviewed the vital signs, available nursing notes, past medical history, past surgical history, family history and social history.     Vital Signs:  Reviewed the patient's vital signs.     Pulse Oximetry Analysis and Interpretation:    100% on Room Air,  normal      External Test Results (Pertinent to encounter):    Records Reviewed: Nursing Notes    History Obtained By: Patient    Provider Notes: Nicholas Mckeon is a 28 y.o. male with ear pain    Co-morbidities Considered:  Tobacco use    Differential Diagnosis:  Otitis media, viral URI      ED Course:    Exam consistent with otitis media, treat symptomatically and will empirically treat with antibiotic course given 3 days of symptoms.  Reviewed past ED visits patient has had multiple visits for ENT complaints in the past.  Smokes tobacco smells and patient smells heavily of marijuana.  Encouraged tobacco cessation.  Last follow-up with primary care         Problems Addressed:  Ear pain    Procedures:   Procedures       Diagnosis and Disposition     Critical Care:      DISCHARGE NOTE:       Nicholas Mckeon's  results have been reviewed with him.  He has been counseled regarding his diagnosis, treatment, and plan.  He verbally conveys understanding and agreement of the signs, symptoms, diagnosis, treatment and prognosis and additionally agrees to follow up as discussed.  He also agrees with the care-plan and conveys that all of his questions have been answered.  I have also provided discharge instructions for him that include: educational information regarding their diagnosis and treatment, and list of reasons why they would want to return to the ED prior to their follow-up appointment, should his condition change. He has been provided with education for proper emergency department utilization.         CLINICAL IMPRESSION:         1. Left otitis media, unspecified otitis media type              PLAN:   1. Discharge Home  2.      Medication List        START taking these medications      amoxicillin 500 MG capsule  Commonly known as: AMOXIL  Take 2 capsules (1,000 mg total) by mouth every 12 (twelve) hours. for 7 days     oxymetazoline 0.05 % nasal spray  Commonly known as: AFRIN  1 spray by Nasal route 2  (two) times daily as needed for Congestion.            CHANGE how you take these medications      * fluticasone propionate 50 mcg/actuation nasal spray  Commonly known as: FLONASE  1 spray (50 mcg total) by Each Nostril route 2 (two) times daily as needed for Rhinitis.  What changed: Another medication with the same name was added. Make sure you understand how and when to take each.     * fluticasone propionate 50 mcg/actuation nasal spray  Commonly known as: FLONASE  2 sprays (100 mcg total) by Each Nostril route 2 (two) times a day.  What changed: You were already taking a medication with the same name, and this prescription was added. Make sure you understand how and when to take each.     * naproxen 500 MG tablet  Commonly known as: NAPROSYN  Take 1 tablet (500 mg total) by mouth every 12 (twelve) hours as needed (Pain).  What changed: Another medication with the same name was added. Make sure you understand how and when to take each.     * naproxen 500 MG tablet  Commonly known as: NAPROSYN  Take 1 tablet (500 mg total) by mouth every 12 (twelve) hours as needed (Pain).  What changed: You were already taking a medication with the same name, and this prescription was added. Make sure you understand how and when to take each.           * This list has 4 medication(s) that are the same as other medications prescribed for you. Read the directions carefully, and ask your doctor or other care provider to review them with you.                ASK your doctor about these medications      azelastine 137 mcg (0.1 %) nasal spray  Commonly known as: ASTELIN  1 spray (137 mcg total) by Nasal route 2 (two) times daily.     ibuprofen 800 MG tablet  Commonly known as: ADVIL,MOTRIN  Take 1 tablet (800 mg total) by mouth every 6 (six) hours as needed for Pain.     ondansetron 4 MG tablet  Commonly known as: ZOFRAN  Take 1 tablet (4 mg total) by mouth every 6 (six) hours.     ondansetron 4 MG Tbdl  Commonly known as:  ZOFRAN-ODT  Take 1 tablet (4 mg total) by mouth every 6 (six) hours as needed.               Where to Get Your Medications        These medications were sent to [id number] - [place], [place] - [address] [place], [place] [address] AT [facility] [address]  [address], [facility] [address]      Phone: [phone]   amoxicillin 500 MG capsule  fluticasone propionate 50 mcg/actuation nasal spray  naproxen 500 MG tablet  oxymetazoline 0.05 % nasal spray        3. No follow-up provider specified.     _______________________________     Please note that this dictation was completed with GoChime, the computer voice recognition software.  Quite often unanticipated grammatical, syntax, homophones, and other interpretive errors are inadvertently transcribed by the computer software.  Please disregard these errors.  Please excuse any errors that have escaped final proofreading.             Pedro Martinez MD  01/06/24 0046

## 2024-10-07 ENCOUNTER — HOSPITAL ENCOUNTER (EMERGENCY)
Facility: HOSPITAL | Age: 29
Discharge: HOME | End: 2024-10-07
Attending: EMERGENCY MEDICINE
Payer: MEDICAID

## 2024-10-07 VITALS
DIASTOLIC BLOOD PRESSURE: 87 MMHG | RESPIRATION RATE: 20 BRPM | HEIGHT: 68 IN | WEIGHT: 192 LBS | BODY MASS INDEX: 29.1 KG/M2 | HEART RATE: 116 BPM | OXYGEN SATURATION: 98 % | SYSTOLIC BLOOD PRESSURE: 139 MMHG | TEMPERATURE: 99 F

## 2024-10-07 DIAGNOSIS — L02.512 ABSCESS OF LEFT HAND: Primary | ICD-10-CM

## 2024-10-07 PROCEDURE — 99284 EMERGENCY DEPT VISIT MOD MDM: CPT

## 2024-10-07 PROCEDURE — 25000003 PHARM REV CODE 250

## 2024-10-07 RX ORDER — DICLOFENAC SODIUM 50 MG/1
50 TABLET, DELAYED RELEASE ORAL 3 TIMES DAILY PRN
Qty: 15 TABLET | Refills: 0 | Status: SHIPPED | OUTPATIENT
Start: 2024-10-07

## 2024-10-07 RX ORDER — SULFAMETHOXAZOLE AND TRIMETHOPRIM 800; 160 MG/1; MG/1
1 TABLET ORAL
Status: COMPLETED | OUTPATIENT
Start: 2024-10-07 | End: 2024-10-07

## 2024-10-07 RX ORDER — KETOROLAC TROMETHAMINE 10 MG/1
10 TABLET, FILM COATED ORAL
Status: COMPLETED | OUTPATIENT
Start: 2024-10-07 | End: 2024-10-07

## 2024-10-07 RX ORDER — SULFAMETHOXAZOLE AND TRIMETHOPRIM 800; 160 MG/1; MG/1
1 TABLET ORAL 2 TIMES DAILY
Qty: 14 TABLET | Refills: 0 | Status: SHIPPED | OUTPATIENT
Start: 2024-10-07 | End: 2024-10-14

## 2024-10-07 RX ADMIN — SULFAMETHOXAZOLE AND TRIMETHOPRIM 1 TABLET: 800; 160 TABLET ORAL at 06:10

## 2024-10-07 RX ADMIN — KETOROLAC TROMETHAMINE 10 MG: 10 TABLET, FILM COATED ORAL at 06:10

## 2024-10-07 NOTE — ED PROVIDER NOTES
Encounter Date: 10/7/2024       History     Chief Complaint   Patient presents with    Hand Injury     Patient reports to the ER with left hand pain. Patient reports it started this morning. Patient reports its a callus and thinks it might be infected and swollen.       This note is dictated on M*Modal word recognition program.  There are word recognition mistakes and grammatical errors that are occasionally missed on review.     Nicholas Mckeon is a 29 y.o. male presents to ER today with complaints of abscess to the palm of his left hand.  Patient reports he stuck a cleaned needle in the abscess and was able to obtain purulent drainage from abscess of left hand at home.  Patient denies any fevers.  Patient reports there was residual pain to left hand.  Rates pain 5/10.    The history is provided by the patient.     Review of patient's allergies indicates:  No Known Allergies  History reviewed. No pertinent past medical history.  History reviewed. No pertinent surgical history.  No family history on file.  Social History     Tobacco Use    Smoking status: Every Day     Current packs/day: 0.50     Average packs/day: 0.5 packs/day for 12.0 years (6.0 ttl pk-yrs)     Types: Vaping with nicotine, Cigarettes    Smokeless tobacco: Never   Substance Use Topics    Alcohol use: Yes     Alcohol/week: 0.0 standard drinks of alcohol     Comment: very rarely, 1 drink    Drug use: Yes     Frequency: 7.0 times per week     Types: Marijuana     Comment: pt states he smokes marijuana daily     Review of Systems   Skin:  Positive for wound.   All other systems reviewed and are negative.      Physical Exam     Initial Vitals [10/07/24 1827]   BP Pulse Resp Temp SpO2   139/87 (!) 121 20 98.6 °F (37 °C) 98 %      MAP       --         Physical Exam    Constitutional: He appears well-developed and well-nourished. He is not diaphoretic.   HENT:   Head: Normocephalic.   Eyes: Pupils are equal, round, and reactive to light.   Neck: Neck  supple.   Normal range of motion.  Cardiovascular:  Normal rate.           No murmur heard.  Pulmonary/Chest: Breath sounds normal. No respiratory distress.   Abdominal: Abdomen is soft.   Musculoskeletal:         General: Tenderness present. No edema.      Cervical back: Normal range of motion and neck supple.     Neurological: He is alert. GCS score is 15. GCS eye subscore is 4. GCS verbal subscore is 5. GCS motor subscore is 6.   Skin: Capillary refill takes less than 2 seconds.   Patient has a small hole to lateral palm of left hand no surrounding cellulitis   Psychiatric: He has a normal mood and affect.         ED Course   Procedures  Labs Reviewed - No data to display       Imaging Results    None          Medications   sulfamethoxazole-trimethoprim 800-160mg per tablet 1 tablet (has no administration in time range)   ketorolac tablet 10 mg (has no administration in time range)     Medical Decision Making  Patient's symptoms consistent with spontaneously draining abscess to left hand.  Will treat patient with Bactrim.  Will treat patient's pain with diclofenac.  No surrounding cellulitis.  Patient afebrile.  Patient stable at time of discharge in no acute distress.  No life-threatening illnesses were found during ER visit today.  Patient was instructed to follow-up with PCP or other recommended specialist within the next 48-72 hours.  Patient was instructed to return to ER immediately for any worsening or concerning symptoms.  All discharge instructions discussed with patient, and patient agrees to comply with discharge instructions given today.     Risk  Prescription drug management.                                      Clinical Impression:  Final diagnoses:  [L02.512] Abscess of left hand (Primary)          ED Disposition Condition    Discharge Stable          ED Prescriptions       Medication Sig Dispense Start Date End Date Auth. Provider    sulfamethoxazole-trimethoprim 800-160mg (BACTRIM DS) 800-160 mg  Tab Take 1 tablet by mouth 2 (two) times daily. for 7 days 14 tablet 10/7/2024 10/14/2024 John Sena NP    diclofenac (VOLTAREN) 50 MG EC tablet Take 1 tablet (50 mg total) by mouth 3 (three) times daily as needed (Pain). 15 tablet 10/7/2024 -- John Sena NP          Follow-up Information       Follow up With Specialties Details Why Contact [facility], Internal Medicine, Gynecology, Dental General Practice Schedule an appointment as soon as possible for a visit in 3 days  [address] [facility] [address]  [phone]               John Sena NP  10/07/24 0005

## 2025-02-16 ENCOUNTER — HOSPITAL ENCOUNTER (EMERGENCY)
Facility: HOSPITAL | Age: 30
Discharge: HOME | End: 2025-02-16
Attending: EMERGENCY MEDICINE
Payer: MEDICAID

## 2025-02-16 VITALS
DIASTOLIC BLOOD PRESSURE: 90 MMHG | TEMPERATURE: 99 F | HEIGHT: 68 IN | BODY MASS INDEX: 31.07 KG/M2 | WEIGHT: 205 LBS | SYSTOLIC BLOOD PRESSURE: 121 MMHG | HEART RATE: 70 BPM | RESPIRATION RATE: 18 BRPM | OXYGEN SATURATION: 99 %

## 2025-02-16 DIAGNOSIS — K52.9 GASTROENTERITIS: ICD-10-CM

## 2025-02-16 DIAGNOSIS — R11.2 NAUSEA AND VOMITING, UNSPECIFIED VOMITING TYPE: Primary | ICD-10-CM

## 2025-02-16 PROCEDURE — 99283 EMERGENCY DEPT VISIT LOW MDM: CPT

## 2025-02-16 PROCEDURE — 25000003 PHARM REV CODE 250: Performed by: EMERGENCY MEDICINE

## 2025-02-16 RX ORDER — ONDANSETRON 4 MG/1
8 TABLET, ORALLY DISINTEGRATING ORAL
Status: COMPLETED | OUTPATIENT
Start: 2025-02-16 | End: 2025-02-16

## 2025-02-16 RX ORDER — ONDANSETRON 8 MG/1
8 TABLET, ORALLY DISINTEGRATING ORAL EVERY 6 HOURS PRN
Qty: 21 TABLET | Refills: 0 | Status: SHIPPED | OUTPATIENT
Start: 2025-02-16

## 2025-02-16 RX ADMIN — ONDANSETRON 8 MG: 4 TABLET, ORALLY DISINTEGRATING ORAL at 07:02

## 2025-02-16 NOTE — Clinical Note
"Nicholas"Aidee Mckeon was seen and treated in our emergency department on 2/16/2025.  He may return to work on 02/17/2025.       If you have any questions or concerns, please don't hesitate to call.      Iron Velazquez MD"

## 2025-02-16 NOTE — ED PROVIDER NOTES
Encounter Date: 2/16/2025       History     Chief Complaint   Patient presents with    Nausea     Patient to the ER with complaints of nausea/vomiting onset last night.      29-year-old male presents the ER stating he vomited at 3:00 a.m. yesterday morning.  States he ate a meet liver is pizza around 10:00 p.m. before bed, woke up vomiting.  Did have some diarrhea yesterday.  Denies abdominal pain.  Patient states he has not eaten meat in over 2 weeks, trying a new diet, and then ate some of the meat [employer] pizza yesterday.  Thinks the pizza messed up his stomach.  No fever.  Vital signs are stable.  Not vomiting now.  Requesting a work excuse.  Alert oriented x4, GCS is 15      Review of patient's allergies indicates:  No Known Allergies  History reviewed. No pertinent past medical history.  History reviewed. No pertinent surgical history.  No family history on file.  Social History[1]  Review of Systems   Constitutional:  Negative for fever.   HENT:  Negative for sore throat.    Respiratory:  Negative for shortness of breath.    Cardiovascular:  Negative for chest pain.   Gastrointestinal:  Positive for nausea. Negative for abdominal distention, abdominal pain, anal bleeding and blood in stool.   Genitourinary:  Negative for dysuria.   Musculoskeletal:  Negative for back pain.   Skin:  Negative for rash.   Neurological:  Negative for weakness.   Hematological:  Does not bruise/bleed easily.   All other systems reviewed and are negative.      Physical Exam     Initial Vitals [02/16/25 0739]   BP Pulse Resp Temp SpO2   (!) 121/90 70 18 98.7 °F (37.1 °C) 99 %      MAP       --         Physical Exam    Nursing note and vitals reviewed.  Constitutional: He appears well-developed and well-nourished. He is not diaphoretic. No distress.   HENT:   Head: Normocephalic and atraumatic.   Eyes: Conjunctivae and EOM are normal. Pupils are equal, round, and reactive to light. Right eye exhibits no discharge. Left eye exhibits no  discharge. No scleral icterus.   Neck: Neck supple. No JVD present.   Normal range of motion.  Cardiovascular:  Normal rate, regular rhythm, normal heart sounds and intact distal pulses.           No murmur heard.  Pulmonary/Chest: Breath sounds normal. No stridor. No respiratory distress. He has no wheezes. He has no rhonchi. He has no rales. He exhibits no tenderness.   Abdominal: Abdomen is soft. Bowel sounds are normal. He exhibits no distension and no mass. There is no abdominal tenderness. There is no rebound and no guarding.   Musculoskeletal:         General: No tenderness or edema. Normal range of motion.      Cervical back: Normal range of motion and neck supple.     Neurological: He is alert and oriented to person, place, and time. He has normal strength. GCS score is 15. GCS eye subscore is 4. GCS verbal subscore is 5. GCS motor subscore is 6.   Skin: Skin is warm and dry. Capillary refill takes less than 2 seconds.         ED Course   Procedures  Labs Reviewed - No data to display       Imaging Results    None          Medications   ondansetron disintegrating tablet 8 mg (has no administration in time range)     Medical Decision Making  Risk  Prescription drug management.                          Medical Decision Making:   Differential Diagnosis:   Gastroenteritis, viral syndrome, nausea vomiting             Clinical Impression:  Final diagnoses:  [R11.2] Nausea and vomiting, unspecified vomiting type (Primary)  [K52.9] Gastroenteritis          ED Disposition Condition    Discharge Stable          ED Prescriptions       Medication Sig Dispense Start Date End Date Auth. Provider    ondansetron (ZOFRAN-ODT) 8 MG TbDL Take 1 tablet (8 mg total) by mouth every 6 (six) hours as needed (Nausea). 21 tablet 2/16/2025 -- Iron Velazquez MD          Follow-up Information       Follow up With Specialties Details Why Contact Info Additional Information    Primary care physician  In 2 days       [facility]  Emergency Department Emergency Medicine  As needed, If symptoms worsen [address] [facility] [id number] [phone] [address]               [1]   Social History  Tobacco Use    Smoking status: Every Day     Current packs/day: 0.50     Average packs/day: 0.5 packs/day for 12.0 years (6.0 ttl pk-yrs)     Types: Vaping with nicotine, Cigarettes    Smokeless tobacco: Never   Substance Use Topics    Alcohol use: Yes     Alcohol/week: 0.0 standard drinks of alcohol     Comment: very rarely, 1 drink    Drug use: Yes     Frequency: 7.0 times per week     Types: Marijuana     Comment: pt states he smokes marijuana daily        Iron Velazquez MD  02/16/25 0723